# Patient Record
Sex: FEMALE | Race: WHITE | NOT HISPANIC OR LATINO | Employment: FULL TIME | URBAN - METROPOLITAN AREA
[De-identification: names, ages, dates, MRNs, and addresses within clinical notes are randomized per-mention and may not be internally consistent; named-entity substitution may affect disease eponyms.]

---

## 2017-03-06 ENCOUNTER — TRANSCRIBE ORDERS (OUTPATIENT)
Dept: ADMINISTRATIVE | Facility: HOSPITAL | Age: 57
End: 2017-03-06

## 2017-03-06 ENCOUNTER — HOSPITAL ENCOUNTER (OUTPATIENT)
Dept: GENERAL RADIOLOGY | Facility: HOSPITAL | Age: 57
Discharge: HOME | End: 2017-03-06
Admitting: PHYSICIAN ASSISTANT

## 2017-03-06 DIAGNOSIS — R52 PAIN: Primary | ICD-10-CM

## 2017-03-06 DIAGNOSIS — R52 PAIN: ICD-10-CM

## 2017-03-06 PROCEDURE — 73610 X-RAY EXAM OF ANKLE: CPT

## 2017-11-02 ENCOUNTER — TRANSCRIBE ORDERS (OUTPATIENT)
Dept: ADMINISTRATIVE | Facility: HOSPITAL | Age: 57
End: 2017-11-02

## 2017-11-02 DIAGNOSIS — Z12.39 SCREENING BREAST EXAMINATION: Primary | ICD-10-CM

## 2017-12-07 ENCOUNTER — HOSPITAL ENCOUNTER (OUTPATIENT)
Dept: MAMMOGRAPHY | Facility: HOSPITAL | Age: 57
Discharge: HOME | End: 2017-12-07
Admitting: NURSE PRACTITIONER

## 2017-12-07 DIAGNOSIS — Z12.39 SCREENING BREAST EXAMINATION: ICD-10-CM

## 2017-12-07 PROCEDURE — 77063 BREAST TOMOSYNTHESIS BI: CPT

## 2017-12-07 PROCEDURE — G0202 SCR MAMMO BI INCL CAD: HCPCS

## 2018-01-31 ENCOUNTER — OFFICE VISIT (OUTPATIENT)
Dept: FAMILY MEDICINE CLINIC | Facility: CLINIC | Age: 58
End: 2018-01-31

## 2018-01-31 VITALS
HEIGHT: 66 IN | BODY MASS INDEX: 38.57 KG/M2 | HEART RATE: 66 BPM | TEMPERATURE: 98.6 F | DIASTOLIC BLOOD PRESSURE: 100 MMHG | SYSTOLIC BLOOD PRESSURE: 162 MMHG | WEIGHT: 240 LBS | RESPIRATION RATE: 16 BRPM

## 2018-01-31 DIAGNOSIS — I10 ESSENTIAL HYPERTENSION: ICD-10-CM

## 2018-01-31 DIAGNOSIS — R73.9 HYPERGLYCEMIA: Primary | ICD-10-CM

## 2018-01-31 DIAGNOSIS — E78.00 HIGH CHOLESTEROL: ICD-10-CM

## 2018-01-31 PROCEDURE — 99203 OFFICE O/P NEW LOW 30 MIN: CPT | Performed by: NURSE PRACTITIONER

## 2018-01-31 RX ORDER — ATORVASTATIN CALCIUM 80 MG/1
TABLET, FILM COATED ORAL
COMMUNITY
Start: 2018-01-17 | End: 2018-01-31 | Stop reason: SDUPTHER

## 2018-01-31 RX ORDER — GEMFIBROZIL 600 MG/1
600 TABLET, FILM COATED ORAL
COMMUNITY
End: 2018-01-31 | Stop reason: SDUPTHER

## 2018-01-31 RX ORDER — SIMVASTATIN 20 MG
TABLET ORAL
COMMUNITY
Start: 2017-12-06 | End: 2018-03-09

## 2018-01-31 RX ORDER — LEVOCETIRIZINE DIHYDROCHLORIDE 5 MG/1
TABLET, FILM COATED ORAL
COMMUNITY
Start: 2018-01-17 | End: 2018-02-08 | Stop reason: SDUPTHER

## 2018-01-31 RX ORDER — AMLODIPINE BESYLATE 10 MG/1
10 TABLET ORAL DAILY
Qty: 90 TABLET | Refills: 0 | Status: SHIPPED | OUTPATIENT
Start: 2018-01-31 | End: 2018-03-09

## 2018-01-31 RX ORDER — CITALOPRAM 40 MG/1
TABLET ORAL
COMMUNITY
Start: 2018-01-17 | End: 2018-02-08 | Stop reason: SDUPTHER

## 2018-01-31 RX ORDER — AMLODIPINE BESYLATE 5 MG/1
TABLET ORAL
COMMUNITY
Start: 2018-01-17 | End: 2018-01-31 | Stop reason: DRUGHIGH

## 2018-01-31 RX ORDER — GEMFIBROZIL 600 MG/1
600 TABLET, FILM COATED ORAL
Qty: 180 TABLET | Refills: 0 | Status: SHIPPED | OUTPATIENT
Start: 2018-01-31 | End: 2019-04-17 | Stop reason: ALTCHOICE

## 2018-01-31 RX ORDER — GEMFIBROZIL 600 MG/1
TABLET, FILM COATED ORAL
COMMUNITY
Start: 2017-10-27 | End: 2018-01-31 | Stop reason: ALTCHOICE

## 2018-01-31 RX ORDER — ATORVASTATIN CALCIUM 80 MG/1
80 TABLET, FILM COATED ORAL NIGHTLY
Qty: 90 TABLET | Refills: 0 | Status: SHIPPED | OUTPATIENT
Start: 2018-01-31 | End: 2018-04-09 | Stop reason: SDUPTHER

## 2018-01-31 RX ORDER — FUROSEMIDE 20 MG/1
TABLET ORAL
COMMUNITY
Start: 2018-01-17 | End: 2018-02-08 | Stop reason: SDUPTHER

## 2018-01-31 NOTE — PROGRESS NOTES
Subjective   Shaunna Carreno is a 57 y.o. female. Patient is here today for   Chief Complaint   Patient presents with   • Establish Care     new patient    • Hypertension          Vitals:    01/31/18 1348   BP: 162/100   Pulse: 66   Resp: 16   Temp: 98.6 °F (37 °C)     The following portions of the patient's history were reviewed and updated as appropriate: allergies, current medications, past family history, past medical history, past social history, past surgical history and problem list.    Past Medical History:   Diagnosis Date   • Hyperlipidemia    • Hypertension       No Known Allergies   Social History     Social History   • Marital status:      Spouse name: N/A   • Number of children: N/A   • Years of education: N/A     Occupational History   • Not on file.     Social History Main Topics   • Smoking status: Never Smoker   • Smokeless tobacco: Never Used   • Alcohol use Yes   • Drug use: Not on file   • Sexual activity: Not on file     Other Topics Concern   • Not on file     Social History Narrative        Current Outpatient Prescriptions:   •  gemfibrozil (LOPID) 600 MG tablet, Take 1 tablet by mouth 2 (Two) Times a Day Before Meals., Disp: 180 tablet, Rfl: 0  •  Probiotic Product (PROBIOTIC DAILY PO), Take  by mouth., Disp: , Rfl:   •  amLODIPine (NORVASC) 10 MG tablet, Take 1 tablet by mouth Daily., Disp: 90 tablet, Rfl: 0  •  atorvastatin (LIPITOR) 80 MG tablet, Take 1 tablet by mouth Every Night., Disp: 90 tablet, Rfl: 0  •  citalopram (CeleXA) 40 MG tablet, , Disp: , Rfl:   •  furosemide (LASIX) 20 MG tablet, , Disp: , Rfl:   •  levocetirizine (XYZAL) 5 MG tablet, , Disp: , Rfl:   •  simvastatin (ZOCOR) 20 MG tablet, , Disp: , Rfl:      Objective     History of Present Illness  Shaunna is here to establish care. She is a previous patient of [facility]. I cannot get the records as they are no longer opened. She has a history of HTN, HLD, and anxiety, and Chron's disease. She reports  that her blood pressure has not been well controlled and is always elevated. She is compliant with her medication. She eats a healthy diet but does not exercise often. She denies any cardiac symptoms  Overall she feels well. She had a full cardiac workup with echo at [facility] in 2013 for an admission for sepsis. I reviewed those records in care everywhere.   She denies any symptoms of NOAH such as daytime hypersomnia or witnessed apnea     Review of Systems   Constitutional: Negative for chills, fatigue and unexpected weight change.   HENT: Negative.    Eyes: Negative for visual disturbance.   Respiratory: Negative for cough and wheezing.    Cardiovascular: Negative for chest pain, palpitations and leg swelling.   Genitourinary: Negative.    Musculoskeletal: Negative for arthralgias.   Neurological: Negative for dizziness, weakness and headaches.       Physical Exam   Constitutional: Vital signs are normal. She appears well-developed and well-nourished. She does not appear ill. No distress.   HENT:   Right Ear: Tympanic membrane and ear canal normal.   Left Ear: Tympanic membrane and ear canal normal.   Nose: Nose normal.   Mouth/Throat: Uvula is midline, oropharynx is clear and moist and mucous membranes are normal.   Cardiovascular: Normal rate, regular rhythm and normal heart sounds.    Pulmonary/Chest: Effort normal and breath sounds normal.   Neurological: She is alert.   Skin: Skin is warm, dry and intact.   Psychiatric: She has a normal mood and affect.       ASSESSMENT     Problem List Items Addressed This Visit     High cholesterol    Relevant Medications    simvastatin (ZOCOR) 20 MG tablet    atorvastatin (LIPITOR) 80 MG tablet    gemfibrozil (LOPID) 600 MG tablet    Hyperglycemia - Primary    Hypertension    Relevant Medications    furosemide (LASIX) 20 MG tablet    amLODIPine (NORVASC) 10 MG tablet          PLAN  Her blood pressure is high, will increase amlodipine  Continue lasix,   Discussed low sodium  diet, weight loss and exercise  Medications refilled,   She is not fasting today for labs, will have her return for labs and a follow up to recheck BP   Will check tsh, UA, A1C, cmp, cbc, hep c screening in one month     Return for with labs fasting .

## 2018-02-08 RX ORDER — CITALOPRAM 40 MG/1
TABLET ORAL
Qty: 90 TABLET | Refills: 0 | Status: SHIPPED | OUTPATIENT
Start: 2018-02-08 | End: 2018-03-09 | Stop reason: SDUPTHER

## 2018-02-08 RX ORDER — FUROSEMIDE 20 MG/1
TABLET ORAL
Qty: 90 TABLET | Refills: 0 | Status: SHIPPED | OUTPATIENT
Start: 2018-02-08 | End: 2018-03-12 | Stop reason: SDUPTHER

## 2018-02-08 RX ORDER — AMLODIPINE BESYLATE 5 MG/1
TABLET ORAL
Qty: 90 TABLET | Refills: 0 | Status: SHIPPED | OUTPATIENT
Start: 2018-02-08 | End: 2018-03-09

## 2018-02-08 RX ORDER — LEVOCETIRIZINE DIHYDROCHLORIDE 5 MG/1
TABLET, FILM COATED ORAL
Qty: 30 TABLET | Refills: 0 | Status: SHIPPED | OUTPATIENT
Start: 2018-02-08 | End: 2018-09-18 | Stop reason: SDUPTHER

## 2018-02-16 ENCOUNTER — TELEPHONE (OUTPATIENT)
Dept: FAMILY MEDICINE CLINIC | Facility: CLINIC | Age: 58
End: 2018-02-16

## 2018-02-16 DIAGNOSIS — Z79.899 LONG TERM USE OF DRUG: ICD-10-CM

## 2018-02-16 DIAGNOSIS — R73.9 HYPERGLYCEMIA: ICD-10-CM

## 2018-02-16 DIAGNOSIS — Z11.59 NEED FOR HEPATITIS C SCREENING TEST: ICD-10-CM

## 2018-02-16 DIAGNOSIS — Z13.29 SCREENING FOR THYROID DISORDER: Primary | ICD-10-CM

## 2018-03-01 DIAGNOSIS — Z11.59 NEED FOR HEPATITIS C SCREENING TEST: ICD-10-CM

## 2018-03-01 DIAGNOSIS — Z79.899 LONG TERM USE OF DRUG: Primary | ICD-10-CM

## 2018-03-01 DIAGNOSIS — R73.9 HYPERGLYCEMIA: ICD-10-CM

## 2018-03-01 DIAGNOSIS — Z13.29 SCREENING FOR THYROID DISORDER: ICD-10-CM

## 2018-03-03 LAB
ALBUMIN SERPL-MCNC: 4.7 G/DL (ref 3.5–5.2)
ALBUMIN/GLOB SERPL: 1.7 G/DL
ALP SERPL-CCNC: 46 U/L (ref 39–117)
ALT SERPL-CCNC: 56 U/L (ref 1–33)
AST SERPL-CCNC: 89 U/L (ref 1–32)
BASOPHILS # BLD AUTO: 0.04 10*3/MM3 (ref 0–0.2)
BASOPHILS NFR BLD AUTO: 0.8 % (ref 0–1.5)
BILIRUB SERPL-MCNC: 0.3 MG/DL (ref 0.1–1.2)
BUN SERPL-MCNC: 10 MG/DL (ref 6–20)
BUN/CREAT SERPL: 25 (ref 7–25)
CALCIUM SERPL-MCNC: 9.8 MG/DL (ref 8.6–10.5)
CHLORIDE SERPL-SCNC: 101 MMOL/L (ref 98–107)
CO2 SERPL-SCNC: 27.9 MMOL/L (ref 22–29)
CREAT SERPL-MCNC: 0.4 MG/DL (ref 0.57–1)
EOSINOPHIL # BLD AUTO: 0.27 10*3/MM3 (ref 0–0.7)
EOSINOPHIL NFR BLD AUTO: 5.3 % (ref 0.3–6.2)
ERYTHROCYTE [DISTWIDTH] IN BLOOD BY AUTOMATED COUNT: 14.1 % (ref 11.7–13)
GFR SERPLBLD CREATININE-BSD FMLA CKD-EPI: >150 ML/MIN/1.73
GFR SERPLBLD CREATININE-BSD FMLA CKD-EPI: >150 ML/MIN/1.73
GLOBULIN SER CALC-MCNC: 2.8 GM/DL
GLUCOSE SERPL-MCNC: 98 MG/DL (ref 65–99)
HBA1C MFR BLD: 5.02 % (ref 4.8–5.6)
HCT VFR BLD AUTO: 35.2 % (ref 35.6–45.5)
HCV AB S/CO SERPL IA: <0.1 S/CO RATIO (ref 0–0.9)
HGB BLD-MCNC: 12.1 G/DL (ref 11.9–15.5)
IMM GRANULOCYTES # BLD: 0.08 10*3/MM3 (ref 0–0.03)
IMM GRANULOCYTES NFR BLD: 1.6 % (ref 0–0.5)
LYMPHOCYTES # BLD AUTO: 1.55 10*3/MM3 (ref 0.9–4.8)
LYMPHOCYTES NFR BLD AUTO: 30.3 % (ref 19.6–45.3)
MCH RBC QN AUTO: 34.4 PG (ref 26.9–32)
MCHC RBC AUTO-ENTMCNC: 34.4 G/DL (ref 32.4–36.3)
MCV RBC AUTO: 100 FL (ref 80.5–98.2)
MICROALBUMIN UR-MCNC: 252.3 UG/ML
MONOCYTES # BLD AUTO: 0.37 10*3/MM3 (ref 0.2–1.2)
MONOCYTES NFR BLD AUTO: 7.2 % (ref 5–12)
NEUTROPHILS # BLD AUTO: 2.8 10*3/MM3 (ref 1.9–8.1)
NEUTROPHILS NFR BLD AUTO: 54.8 % (ref 42.7–76)
PLATELET # BLD AUTO: 179 10*3/MM3 (ref 140–500)
POTASSIUM SERPL-SCNC: 4.1 MMOL/L (ref 3.5–5.2)
PROT SERPL-MCNC: 7.5 G/DL (ref 6–8.5)
RBC # BLD AUTO: 3.52 10*6/MM3 (ref 3.9–5.2)
SODIUM SERPL-SCNC: 145 MMOL/L (ref 136–145)
TSH SERPL DL<=0.005 MIU/L-ACNC: 3.86 MIU/ML (ref 0.27–4.2)
WBC # BLD AUTO: 5.11 10*3/MM3 (ref 4.5–10.7)

## 2018-03-08 LAB
CHOLEST SERPL-MCNC: 239 MG/DL (ref 0–200)
HDLC SERPL-MCNC: 65 MG/DL (ref 40–60)
LDLC SERPL CALC-MCNC: 117 MG/DL (ref 0–100)
LDLC/HDLC SERPL: 1.81 {RATIO}
Lab: NORMAL
TRIGL SERPL-MCNC: 283 MG/DL (ref 0–150)
VLDLC SERPL CALC-MCNC: 56.6 MG/DL (ref 5–40)
WRITTEN AUTHORIZATION: NORMAL

## 2018-03-09 ENCOUNTER — OFFICE VISIT (OUTPATIENT)
Dept: FAMILY MEDICINE CLINIC | Facility: CLINIC | Age: 58
End: 2018-03-09

## 2018-03-09 VITALS
BODY MASS INDEX: 37.83 KG/M2 | TEMPERATURE: 97 F | OXYGEN SATURATION: 98 % | SYSTOLIC BLOOD PRESSURE: 160 MMHG | HEIGHT: 67 IN | DIASTOLIC BLOOD PRESSURE: 90 MMHG | WEIGHT: 241 LBS | RESPIRATION RATE: 17 BRPM | HEART RATE: 97 BPM

## 2018-03-09 DIAGNOSIS — E78.00 HIGH CHOLESTEROL: Primary | ICD-10-CM

## 2018-03-09 DIAGNOSIS — Z23 NEED FOR DIPHTHERIA-TETANUS-PERTUSSIS (TDAP) VACCINE: ICD-10-CM

## 2018-03-09 DIAGNOSIS — E66.9 OBESITY (BMI 30-39.9): ICD-10-CM

## 2018-03-09 DIAGNOSIS — I10 ESSENTIAL HYPERTENSION: ICD-10-CM

## 2018-03-09 DIAGNOSIS — F41.9 ANXIETY: ICD-10-CM

## 2018-03-09 PROCEDURE — 99214 OFFICE O/P EST MOD 30 MIN: CPT | Performed by: NURSE PRACTITIONER

## 2018-03-09 PROCEDURE — 90715 TDAP VACCINE 7 YRS/> IM: CPT | Performed by: NURSE PRACTITIONER

## 2018-03-09 PROCEDURE — 90471 IMMUNIZATION ADMIN: CPT | Performed by: NURSE PRACTITIONER

## 2018-03-09 RX ORDER — AMLODIPINE BESYLATE 10 MG/1
10 TABLET ORAL DAILY
COMMUNITY
End: 2018-04-09 | Stop reason: SDUPTHER

## 2018-03-09 RX ORDER — CITALOPRAM 40 MG/1
40 TABLET ORAL DAILY
Qty: 90 TABLET | Refills: 3 | Status: SHIPPED | OUTPATIENT
Start: 2018-03-09 | End: 2018-04-09 | Stop reason: SDUPTHER

## 2018-03-09 RX ORDER — FUROSEMIDE 20 MG/1
TABLET ORAL
Qty: 90 TABLET | Refills: 0 | Status: CANCELLED | OUTPATIENT
Start: 2018-03-09

## 2018-03-09 NOTE — PROGRESS NOTES
Subjective   Shaunna Carreno is a 57 y.o. female. Patient is here today for   Chief Complaint   Patient presents with   • Hyperlipidemia   • Anxiety     medication refill          Vitals:    03/09/18 0807   BP: 160/90   Pulse: 97   Resp: 17   Temp: 97 °F (36.1 °C)   SpO2: 98%       The following portions of the patient's history were reviewed and updated as appropriate: allergies, current medications, past family history, past medical history, past social history, past surgical history and problem list.    Past Medical History:   Diagnosis Date   • Hyperlipidemia    • Hypertension       No Known Allergies   Social History     Social History   • Marital status:      Spouse name: N/A   • Number of children: N/A   • Years of education: N/A     Occupational History   • Not on file.     Social History Main Topics   • Smoking status: Never Smoker   • Smokeless tobacco: Never Used   • Alcohol use Yes   • Drug use: Not on file   • Sexual activity: Not on file     Other Topics Concern   • Not on file     Social History Narrative        Current Outpatient Prescriptions:   •  amLODIPine (NORVASC) 10 MG tablet, Take 10 mg by mouth Daily., Disp: , Rfl:   •  atorvastatin (LIPITOR) 80 MG tablet, Take 1 tablet by mouth Every Night., Disp: 90 tablet, Rfl: 0  •  citalopram (CeleXA) 40 MG tablet, Take 1 tablet by mouth Daily., Disp: 90 tablet, Rfl: 3  •  furosemide (LASIX) 20 MG tablet, TAKE ONE tablet (by mouth) EACH DAY, Disp: 90 tablet, Rfl: 0  •  gemfibrozil (LOPID) 600 MG tablet, Take 1 tablet by mouth 2 (Two) Times a Day Before Meals., Disp: 180 tablet, Rfl: 0  •  levocetirizine (XYZAL) 5 MG tablet, TAKE ONE tablet (by mouth) EACH EVENING, Disp: 30 tablet, Rfl: 0  •  Probiotic Product (PROBIOTIC DAILY PO), Take  by mouth., Disp: , Rfl:      Objective   History of Present Illness  Shaunna is here for a follow up for HLD, HTN, and anxiety. She reports that she has been watching her diet but not exercising regularly. She has not  been checking her BP at home. I increased amlodipine to 10mg. She has been compliant with her medication and is not experiencing any side effects. She ran out of her celexa and has had increased anxiety. She needs this refilled today.     Review of Systems   Constitutional: Negative for chills, fatigue and fever.   HENT: Negative.    Eyes: Negative for visual disturbance.   Respiratory: Negative for cough, chest tightness, shortness of breath and wheezing.    Cardiovascular: Negative for chest pain, palpitations and leg swelling.   Gastrointestinal: Negative.    Genitourinary: Negative.    Musculoskeletal: Positive for arthralgias (wrist pain ).   Neurological: Negative for dizziness, tremors, syncope, speech difficulty, weakness, light-headedness and headaches.   Psychiatric/Behavioral: Negative for dysphoric mood and sleep disturbance. The patient is nervous/anxious.        Physical Exam   Constitutional: She is oriented to person, place, and time. Vital signs are normal. She appears well-developed and well-nourished. She does not appear ill. No distress.   HENT:   Mouth/Throat: Mucous membranes are normal.   Cardiovascular: Normal rate, regular rhythm and normal heart sounds.    Pulmonary/Chest: Effort normal and breath sounds normal.   Neurological: She is alert and oriented to person, place, and time.   Skin: Skin is warm, dry and intact.   Psychiatric: She has a normal mood and affect.     No visits with results within 1 Week(s) from this visit.  Latest known visit with results is:    Orders Only on 03/01/2018   Component Date Value Ref Range Status   • TSH 03/02/2018 3.860  0.270 - 4.200 mIU/mL Final   • Microalbumin, Urine 03/02/2018 252.3  Not Estab. ug/mL Final   • Hemoglobin A1C 03/02/2018 5.02  4.80 - 5.60 % Final    Comment: Hemoglobin A1C Ranges:  Increased Risk for Diabetes  5.7% to 6.4%  Diabetes                     >= 6.5%  Diabetic Goal                < 7.0%     • Glucose 03/02/2018 98  65 - 99  mg/dL Final   • BUN 03/02/2018 10  6 - 20 mg/dL Final   • Creatinine 03/02/2018 0.40* 0.57 - 1.00 mg/dL Final   • eGFR Non  Am 03/02/2018 >150  >60 mL/min/1.73 Final   • eGFR African Am 03/02/2018 >150  >60 mL/min/1.73 Final   • BUN/Creatinine Ratio 03/02/2018 25.0  7.0 - 25.0 Final   • Sodium 03/02/2018 145  136 - 145 mmol/L Final   • Potassium 03/02/2018 4.1  3.5 - 5.2 mmol/L Final   • Chloride 03/02/2018 101  98 - 107 mmol/L Final   • Total CO2 03/02/2018 27.9  22.0 - 29.0 mmol/L Final   • Calcium 03/02/2018 9.8  8.6 - 10.5 mg/dL Final   • Total Protein 03/02/2018 7.5  6.0 - 8.5 g/dL Final   • Albumin 03/02/2018 4.70  3.50 - 5.20 g/dL Final   • Globulin 03/02/2018 2.8  gm/dL Final   • A/G Ratio 03/02/2018 1.7  g/dL Final   • Total Bilirubin 03/02/2018 0.3  0.1 - 1.2 mg/dL Final   • Alkaline Phosphatase 03/02/2018 46  39 - 117 U/L Final   • AST (SGOT) 03/02/2018 89* 1 - 32 U/L Final   • ALT (SGPT) 03/02/2018 56* 1 - 33 U/L Final   • WBC 03/02/2018 5.11  4.50 - 10.70 10*3/mm3 Final   • RBC 03/02/2018 3.52* 3.90 - 5.20 10*6/mm3 Final   • Hemoglobin 03/02/2018 12.1  11.9 - 15.5 g/dL Final   • Hematocrit 03/02/2018 35.2* 35.6 - 45.5 % Final   • MCV 03/02/2018 100.0* 80.5 - 98.2 fL Final   • MCH 03/02/2018 34.4* 26.9 - 32.0 pg Final   • MCHC 03/02/2018 34.4  32.4 - 36.3 g/dL Final   • RDW 03/02/2018 14.1* 11.7 - 13.0 % Final   • Platelets 03/02/2018 179  140 - 500 10*3/mm3 Final   • Neutrophil Rel % 03/02/2018 54.8  42.7 - 76.0 % Final   • Lymphocyte Rel % 03/02/2018 30.3  19.6 - 45.3 % Final   • Monocyte Rel % 03/02/2018 7.2  5.0 - 12.0 % Final   • Eosinophil Rel % 03/02/2018 5.3  0.3 - 6.2 % Final   • Basophil Rel % 03/02/2018 0.8  0.0 - 1.5 % Final   • Neutrophils Absolute 03/02/2018 2.80  1.90 - 8.10 10*3/mm3 Final   • Lymphocytes Absolute 03/02/2018 1.55  0.90 - 4.80 10*3/mm3 Final   • Monocytes Absolute 03/02/2018 0.37  0.20 - 1.20 10*3/mm3 Final   • Eosinophils Absolute 03/02/2018 0.27  0.00 - 0.70  10*3/mm3 Final   • Basophils Absolute 03/02/2018 0.04  0.00 - 0.20 10*3/mm3 Final   • Immature Granulocyte Rel % 03/02/2018 1.6* 0.0 - 0.5 % Final   • Immature Grans Absolute 03/02/2018 0.08* 0.00 - 0.03 10*3/mm3 Final   • Hep C Virus Ab 03/02/2018 <0.1  0.0 - 0.9 s/co ratio Final    Comment:                                   Negative:     < 0.8                               Indeterminate: 0.8 - 0.9                                    Positive:     > 0.9   The CDC recommends that a positive HCV antibody result   be followed up with a HCV Nucleic Acid Amplification   test ([id number]).     • Total Cholesterol 03/02/2018 239* 0 - 200 mg/dL Final   • Triglycerides 03/02/2018 283* 0 - 150 mg/dL Final   • HDL Cholesterol 03/02/2018 65* 40 - 60 mg/dL Final   • VLDL Cholesterol 03/02/2018 56.6* 5 - 40 mg/dL Final   • LDL Cholesterol  03/02/2018 117* 0 - 100 mg/dL Final   • LDL/HDL Ratio 03/02/2018 1.81   Final   • Please note 03/02/2018 Comment   Final    Comment: We have received your request for additional testing or test  verification.  You will be notified if we are unable to process  your request.     • Written Authorization 03/02/2018 Comment   Final    Comment: Written Authorization Received.  Authorization received from WRITTEN REQUEST 03-  Logged by Merry Morrissey         ASSESSMENT  Problem List Items Addressed This Visit     High cholesterol - Primary    Hypertension    Relevant Medications    amLODIPine (NORVASC) 10 MG tablet    Obesity (BMI 30-39.9)      Other Visit Diagnoses     Anxiety        Need for diphtheria-tetanus-pertussis (Tdap) vaccine        Relevant Orders    Tdap Vaccine Greater Than or Equal To 6yo IM (Completed)          PLAN  I reviewed her labs with her in detail and compared to last years lipid panel that she brought from her previous office. Her cholesterol is about the same ldl has improved and trigs have improved. She has been taking atorvastatin and simvastatin and her liver enzymes are  elevated. I have asked her to stop the zocor and if her cholesterol is still elevated will add zetia  Monitor BP at home, call if >140/90, I would like her to follow up in 4 weeks for a BP check, may consider adding losartan if still elevated  celexa refilled  Add exercise,   Information given on vascular screens  TDAP today  Will bring in colonoscopy report from 2 years ago    Return for 4 weeks with BP check , Recheck fall with lipid panel and cmp .

## 2018-03-12 RX ORDER — FUROSEMIDE 20 MG/1
20 TABLET ORAL DAILY
Qty: 90 TABLET | Refills: 0 | Status: SHIPPED | OUTPATIENT
Start: 2018-03-12 | End: 2018-04-30 | Stop reason: SDUPTHER

## 2018-03-12 RX ORDER — CETIRIZINE HYDROCHLORIDE 10 MG/1
10 TABLET ORAL DAILY
Qty: 30 TABLET | Refills: 5 | Status: SHIPPED | OUTPATIENT
Start: 2018-03-12 | End: 2018-04-30 | Stop reason: SDUPTHER

## 2018-03-13 ENCOUNTER — TELEPHONE (OUTPATIENT)
Dept: FAMILY MEDICINE CLINIC | Facility: CLINIC | Age: 58
End: 2018-03-13

## 2018-03-13 NOTE — TELEPHONE ENCOUNTER
----spoke to patient rx was called in to local pharmacy onfile    - Message from Rain Christianson sent at 3/13/2018  8:21 AM EDT -----  PHARMACY DIDN'T RECIEVE THE RX FUROSEMIDE 20MG  AND SHE IS WONDERING IF SHE WAS SUPPOSE TO HAVE THAT MEDICATION SENT TO THE PHARMACY     PLEASE CALL PT TO LET HER KNOW IF THAT MED WAS ONE THAT SHE WAS TO RECEIVE OR NOT     [phone]

## 2018-04-09 ENCOUNTER — OFFICE VISIT (OUTPATIENT)
Dept: FAMILY MEDICINE CLINIC | Facility: CLINIC | Age: 58
End: 2018-04-09

## 2018-04-09 VITALS
SYSTOLIC BLOOD PRESSURE: 150 MMHG | OXYGEN SATURATION: 98 % | HEIGHT: 67 IN | HEART RATE: 74 BPM | TEMPERATURE: 98.2 F | RESPIRATION RATE: 17 BRPM | BODY MASS INDEX: 37.04 KG/M2 | WEIGHT: 236 LBS | DIASTOLIC BLOOD PRESSURE: 82 MMHG

## 2018-04-09 DIAGNOSIS — I10 ESSENTIAL HYPERTENSION: Primary | ICD-10-CM

## 2018-04-09 PROCEDURE — 99213 OFFICE O/P EST LOW 20 MIN: CPT | Performed by: NURSE PRACTITIONER

## 2018-04-09 RX ORDER — ATORVASTATIN CALCIUM 80 MG/1
80 TABLET, FILM COATED ORAL NIGHTLY
Qty: 30 TABLET | Refills: 1 | Status: SHIPPED | OUTPATIENT
Start: 2018-04-09 | End: 2018-10-01 | Stop reason: SDUPTHER

## 2018-04-09 RX ORDER — LOSARTAN POTASSIUM 25 MG/1
25 TABLET ORAL DAILY
Qty: 90 TABLET | Refills: 3 | Status: SHIPPED | OUTPATIENT
Start: 2018-04-09 | End: 2018-04-09 | Stop reason: SDUPTHER

## 2018-04-09 RX ORDER — ATORVASTATIN CALCIUM 80 MG/1
80 TABLET, FILM COATED ORAL NIGHTLY
Qty: 90 TABLET | Refills: 1 | Status: SHIPPED | OUTPATIENT
Start: 2018-04-09 | End: 2018-04-09 | Stop reason: SDUPTHER

## 2018-04-09 RX ORDER — CITALOPRAM 40 MG/1
40 TABLET ORAL DAILY
Qty: 90 TABLET | Refills: 3 | Status: SHIPPED | OUTPATIENT
Start: 2018-04-09 | End: 2018-04-30 | Stop reason: SDUPTHER

## 2018-04-09 RX ORDER — AMLODIPINE BESYLATE 10 MG/1
10 TABLET ORAL DAILY
Qty: 90 TABLET | Refills: 3 | Status: SHIPPED | OUTPATIENT
Start: 2018-04-09 | End: 2018-04-09 | Stop reason: SDUPTHER

## 2018-04-09 RX ORDER — LOSARTAN POTASSIUM 25 MG/1
25 TABLET ORAL DAILY
Qty: 30 TABLET | Refills: 3 | Status: SHIPPED | OUTPATIENT
Start: 2018-04-09 | End: 2018-10-01 | Stop reason: SDUPTHER

## 2018-04-09 RX ORDER — AMLODIPINE BESYLATE 10 MG/1
10 TABLET ORAL DAILY
Qty: 30 TABLET | Refills: 3 | Status: SHIPPED | OUTPATIENT
Start: 2018-04-09 | End: 2019-04-17 | Stop reason: SDUPTHER

## 2018-04-09 NOTE — PROGRESS NOTES
Subjective   Shaunna Carreno is a 57 y.o. female.   Chief Complaint   Patient presents with   • Hypertension     Vitals:    04/09/18 0810   BP: 150/82   Pulse: 74   Resp: 17   Temp: 98.2 °F (36.8 °C)   SpO2: 98%     No LMP recorded. Patient is postmenopausal.    History of Present Illness  Shaunna is here for a 4 week blood pressure check. She has a history of HTN. She came from another practice and although her BP was running 150-160/90 she was only on lasix. She was started on amlodipine which was increased to 10mg at her last visit. She has been monitoring her BP at home and reports that her blood pressure has been 150's systolic and below 90 diastolic. She has been watching what she eats but has not been exercising. She has lost 5 lbs in one month. She overall feels great. She has been compliant with her medication and is not experiencing any side effects.     The following portions of the patient's history were reviewed and updated as appropriate: allergies, current medications, past family history, past medical history, past social history, past surgical history and problem list.    Review of Systems   Constitutional: Negative for fatigue.   Eyes: Negative for visual disturbance.   Respiratory: Negative.    Cardiovascular: Positive for leg swelling. Negative for chest pain and palpitations.   Gastrointestinal: Negative.    Musculoskeletal: Negative for arthralgias.   Neurological: Negative for dizziness and headaches.       Objective   Physical Exam   Constitutional: Vital signs are normal. She appears well-developed and well-nourished. She does not appear ill. No distress.   Cardiovascular: Normal rate, regular rhythm and normal heart sounds.    Pulmonary/Chest: Effort normal and breath sounds normal.   Musculoskeletal:        Right lower leg: She exhibits no swelling.        Left lower leg: She exhibits no swelling.   Neurological: She is alert.   Skin: Skin is warm and dry.       Assessment/Plan   Shaunna was  seen today for hypertension.    Diagnoses and all orders for this visit:    Essential hypertension    Other orders  -     Discontinue: losartan (COZAAR) 25 MG tablet; Take 1 tablet by mouth Daily.  -     Discontinue: atorvastatin (LIPITOR) 80 MG tablet; Take 1 tablet by mouth Every Night.  -     Discontinue: amLODIPine (NORVASC) 10 MG tablet; Take 1 tablet by mouth Daily.  -     losartan (COZAAR) 25 MG tablet; Take 1 tablet by mouth Daily.  -     amLODIPine (NORVASC) 10 MG tablet; Take 1 tablet by mouth Daily.  -     atorvastatin (LIPITOR) 80 MG tablet; Take 1 tablet by mouth Every Night.      Will start losartan 25mg daily  Monitor BP at home 1-2 times daily.   Exercise, continue with diet and weight loss  BP goal is less than 130/80  30 day meds sent to retail and 90 sent to mail order  She will bring in her BP log in 2-4 weeks for me to review   Follow up in the fall with labs or sooner if needed   Will bring in colonoscopy report

## 2018-04-30 ENCOUNTER — TELEPHONE (OUTPATIENT)
Dept: FAMILY MEDICINE CLINIC | Facility: CLINIC | Age: 58
End: 2018-04-30

## 2018-04-30 RX ORDER — CETIRIZINE HYDROCHLORIDE 10 MG/1
10 TABLET ORAL DAILY
Qty: 90 TABLET | Refills: 1 | Status: SHIPPED | OUTPATIENT
Start: 2018-04-30 | End: 2018-09-18 | Stop reason: SDUPTHER

## 2018-04-30 RX ORDER — FUROSEMIDE 20 MG/1
20 TABLET ORAL DAILY
Qty: 90 TABLET | Refills: 1 | Status: SHIPPED | OUTPATIENT
Start: 2018-04-30 | End: 2018-10-25 | Stop reason: SDUPTHER

## 2018-04-30 RX ORDER — CITALOPRAM 40 MG/1
40 TABLET ORAL DAILY
Qty: 90 TABLET | Refills: 1 | Status: SHIPPED | OUTPATIENT
Start: 2018-04-30 | End: 2018-10-25 | Stop reason: SDUPTHER

## 2018-08-28 ENCOUNTER — TRANSCRIBE ORDERS (OUTPATIENT)
Dept: ADMINISTRATIVE | Facility: HOSPITAL | Age: 58
End: 2018-08-28

## 2018-08-28 DIAGNOSIS — M67.431 GANGLION OF RIGHT WRIST: ICD-10-CM

## 2018-08-28 DIAGNOSIS — M67.432 GANGLION OF LEFT WRIST: Primary | ICD-10-CM

## 2018-08-31 ENCOUNTER — HOSPITAL ENCOUNTER (OUTPATIENT)
Dept: ULTRASOUND IMAGING | Facility: HOSPITAL | Age: 58
Discharge: HOME | End: 2018-08-31
Attending: PLASTIC SURGERY | Admitting: PLASTIC SURGERY

## 2018-08-31 DIAGNOSIS — M67.432 GANGLION OF LEFT WRIST: ICD-10-CM

## 2018-08-31 DIAGNOSIS — M67.431 GANGLION OF RIGHT WRIST: ICD-10-CM

## 2018-08-31 PROCEDURE — 76882 US LMTD JT/FCL EVL NVASC XTR: CPT

## 2018-09-13 DIAGNOSIS — Z79.899 LONG TERM USE OF DRUG: Primary | ICD-10-CM

## 2018-09-13 DIAGNOSIS — E78.00 HIGH CHOLESTEROL: ICD-10-CM

## 2018-09-13 DIAGNOSIS — E78.5 HYPERLIPIDEMIA, UNSPECIFIED HYPERLIPIDEMIA TYPE: ICD-10-CM

## 2018-09-14 LAB
ALBUMIN SERPL-MCNC: 5.1 G/DL (ref 3.5–5.2)
ALBUMIN/GLOB SERPL: 2.1 G/DL
ALP SERPL-CCNC: 50 U/L (ref 39–117)
ALT SERPL-CCNC: 40 U/L (ref 1–33)
AST SERPL-CCNC: 37 U/L (ref 1–32)
BASOPHILS # BLD AUTO: 0.04 10*3/MM3 (ref 0–0.2)
BASOPHILS NFR BLD AUTO: 0.7 % (ref 0–1.5)
BILIRUB SERPL-MCNC: 0.4 MG/DL (ref 0.1–1.2)
BUN SERPL-MCNC: 14 MG/DL (ref 6–20)
BUN/CREAT SERPL: 31.8 (ref 7–25)
CALCIUM SERPL-MCNC: 10 MG/DL (ref 8.6–10.5)
CHLORIDE SERPL-SCNC: 100 MMOL/L (ref 98–107)
CHOLEST SERPL-MCNC: 274 MG/DL (ref 0–200)
CO2 SERPL-SCNC: 27.4 MMOL/L (ref 22–29)
CREAT SERPL-MCNC: 0.44 MG/DL (ref 0.57–1)
EOSINOPHIL # BLD AUTO: 0.2 10*3/MM3 (ref 0–0.7)
EOSINOPHIL NFR BLD AUTO: 3.7 % (ref 0.3–6.2)
ERYTHROCYTE [DISTWIDTH] IN BLOOD BY AUTOMATED COUNT: 13.9 % (ref 11.7–13)
GLOBULIN SER CALC-MCNC: 2.4 GM/DL
GLUCOSE SERPL-MCNC: 94 MG/DL (ref 65–99)
HCT VFR BLD AUTO: 40.3 % (ref 35.6–45.5)
HDLC SERPL-MCNC: 80 MG/DL (ref 40–60)
HGB BLD-MCNC: 13.2 G/DL (ref 11.9–15.5)
IMM GRANULOCYTES # BLD: 0.05 10*3/MM3 (ref 0–0.03)
IMM GRANULOCYTES NFR BLD: 0.9 % (ref 0–0.5)
LDLC SERPL CALC-MCNC: 124 MG/DL (ref 0–100)
LDLC/HDLC SERPL: 1.55 {RATIO}
LYMPHOCYTES # BLD AUTO: 1.76 10*3/MM3 (ref 0.9–4.8)
LYMPHOCYTES NFR BLD AUTO: 32.5 % (ref 19.6–45.3)
MCH RBC QN AUTO: 33.8 PG (ref 26.9–32)
MCHC RBC AUTO-ENTMCNC: 32.8 G/DL (ref 32.4–36.3)
MCV RBC AUTO: 103.3 FL (ref 80.5–98.2)
MONOCYTES # BLD AUTO: 0.47 10*3/MM3 (ref 0.2–1.2)
MONOCYTES NFR BLD AUTO: 8.7 % (ref 5–12)
NEUTROPHILS # BLD AUTO: 2.9 10*3/MM3 (ref 1.9–8.1)
NEUTROPHILS NFR BLD AUTO: 53.5 % (ref 42.7–76)
PLATELET # BLD AUTO: 189 10*3/MM3 (ref 140–500)
POTASSIUM SERPL-SCNC: 4.3 MMOL/L (ref 3.5–5.2)
PROT SERPL-MCNC: 7.5 G/DL (ref 6–8.5)
RBC # BLD AUTO: 3.9 10*6/MM3 (ref 3.9–5.2)
SODIUM SERPL-SCNC: 142 MMOL/L (ref 136–145)
TRIGL SERPL-MCNC: 349 MG/DL (ref 0–150)
VLDLC SERPL CALC-MCNC: 69.8 MG/DL (ref 5–40)
WBC # BLD AUTO: 5.42 10*3/MM3 (ref 4.5–10.7)

## 2018-09-18 RX ORDER — CETIRIZINE HYDROCHLORIDE 10 MG/1
TABLET ORAL
Qty: 30 TABLET | Refills: 3 | Status: SHIPPED | OUTPATIENT
Start: 2018-09-18 | End: 2019-03-12 | Stop reason: SDUPTHER

## 2018-09-19 ENCOUNTER — LAB (OUTPATIENT)
Dept: LAB | Facility: HOSPITAL | Age: 58
End: 2018-09-19
Attending: PLASTIC SURGERY

## 2018-09-19 ENCOUNTER — TRANSCRIBE ORDERS (OUTPATIENT)
Dept: ADMINISTRATIVE | Facility: HOSPITAL | Age: 58
End: 2018-09-19

## 2018-09-19 ENCOUNTER — HOSPITAL ENCOUNTER (OUTPATIENT)
Dept: CARDIOLOGY | Facility: HOSPITAL | Age: 58
Discharge: HOME | End: 2018-09-19
Attending: PLASTIC SURGERY | Admitting: PLASTIC SURGERY

## 2018-09-19 DIAGNOSIS — Z01.818 PRE-OP EXAM: ICD-10-CM

## 2018-09-19 DIAGNOSIS — Z01.818 PRE-OP EXAM: Primary | ICD-10-CM

## 2018-09-19 LAB
ANION GAP SERPL CALCULATED.3IONS-SCNC: 13.6 MMOL/L
BUN BLD-MCNC: 12 MG/DL (ref 6–20)
BUN/CREAT SERPL: 23.1 (ref 7–25)
CALCIUM SPEC-SCNC: 10 MG/DL (ref 8.6–10.5)
CHLORIDE SERPL-SCNC: 102 MMOL/L (ref 98–107)
CO2 SERPL-SCNC: 25.4 MMOL/L (ref 22–29)
CREAT BLD-MCNC: 0.52 MG/DL (ref 0.57–1)
GFR SERPL CREATININE-BSD FRML MDRD: 122 ML/MIN/1.73
GLUCOSE BLD-MCNC: 112 MG/DL (ref 65–99)
POTASSIUM BLD-SCNC: 3.7 MMOL/L (ref 3.5–5.2)
SODIUM BLD-SCNC: 141 MMOL/L (ref 136–145)

## 2018-09-19 PROCEDURE — 36415 COLL VENOUS BLD VENIPUNCTURE: CPT

## 2018-09-19 PROCEDURE — 93010 ELECTROCARDIOGRAM REPORT: CPT | Performed by: INTERNAL MEDICINE

## 2018-09-19 PROCEDURE — 93005 ELECTROCARDIOGRAM TRACING: CPT | Performed by: PLASTIC SURGERY

## 2018-09-19 PROCEDURE — 80048 BASIC METABOLIC PNL TOTAL CA: CPT

## 2018-09-25 ENCOUNTER — LAB REQUISITION (OUTPATIENT)
Dept: LAB | Facility: HOSPITAL | Age: 58
End: 2018-09-25

## 2018-09-25 DIAGNOSIS — M67.432 GANGLION OF LEFT WRIST: ICD-10-CM

## 2018-09-25 PROCEDURE — 88304 TISSUE EXAM BY PATHOLOGIST: CPT | Performed by: PLASTIC SURGERY

## 2018-09-25 RX ORDER — ATORVASTATIN CALCIUM 80 MG/1
TABLET, FILM COATED ORAL
Qty: 90 TABLET | Refills: 1 | Status: SHIPPED | OUTPATIENT
Start: 2018-09-25 | End: 2019-03-24 | Stop reason: SDUPTHER

## 2018-09-26 LAB
CYTO UR: NORMAL
LAB AP CASE REPORT: NORMAL
LAB AP CLINICAL INFORMATION: NORMAL
PATH REPORT.FINAL DX SPEC: NORMAL
PATH REPORT.GROSS SPEC: NORMAL

## 2018-10-01 ENCOUNTER — OFFICE VISIT (OUTPATIENT)
Dept: FAMILY MEDICINE CLINIC | Facility: CLINIC | Age: 58
End: 2018-10-01

## 2018-10-01 VITALS
RESPIRATION RATE: 16 BRPM | SYSTOLIC BLOOD PRESSURE: 142 MMHG | HEIGHT: 67 IN | OXYGEN SATURATION: 100 % | HEART RATE: 98 BPM | DIASTOLIC BLOOD PRESSURE: 76 MMHG | TEMPERATURE: 98.2 F | BODY MASS INDEX: 37.04 KG/M2 | WEIGHT: 236 LBS

## 2018-10-01 DIAGNOSIS — R73.9 HYPERGLYCEMIA: ICD-10-CM

## 2018-10-01 DIAGNOSIS — I10 ESSENTIAL HYPERTENSION: ICD-10-CM

## 2018-10-01 DIAGNOSIS — E78.00 HIGH CHOLESTEROL: Primary | ICD-10-CM

## 2018-10-01 PROCEDURE — 99214 OFFICE O/P EST MOD 30 MIN: CPT | Performed by: NURSE PRACTITIONER

## 2018-10-01 RX ORDER — LOSARTAN POTASSIUM 50 MG/1
50 TABLET ORAL DAILY
Qty: 90 TABLET | Refills: 3 | Status: SHIPPED | OUTPATIENT
Start: 2018-10-01 | End: 2019-07-08 | Stop reason: SDUPTHER

## 2018-10-01 NOTE — PROGRESS NOTES
Subjective   Shaunna Carreno is a 57 y.o. female. Patient is here today for   Chief Complaint   Patient presents with   • Hyperlipidemia   • Hypertension          Vitals:    10/01/18 0804   BP: 142/76   Pulse: 98   Resp: 16   Temp: 98.2 °F (36.8 °C)   SpO2: 100%       The following portions of the patient's history were reviewed and updated as appropriate: allergies, current medications, past family history, past medical history, past social history, past surgical history and problem list.    Past Medical History:   Diagnosis Date   • Hyperlipidemia    • Hypertension       No Known Allergies   Social History     Social History   • Marital status:      Spouse name: N/A   • Number of children: N/A   • Years of education: N/A     Occupational History   • Not on file.     Social History Main Topics   • Smoking status: Never Smoker   • Smokeless tobacco: Never Used   • Alcohol use Yes   • Drug use: Unknown   • Sexual activity: Not on file     Other Topics Concern   • Not on file     Social History Narrative   • No narrative on file        Current Outpatient Prescriptions:   •  amLODIPine (NORVASC) 10 MG tablet, Take 1 tablet by mouth Daily., Disp: 30 tablet, Rfl: 3  •  atorvastatin (LIPITOR) 80 MG tablet, TAKE 1 TABLET EVERY NIGHT, Disp: 90 tablet, Rfl: 1  •  cetirizine (zyrTEC) 10 MG tablet, TAKE ONE tablet (by mouth) EACH DAY for ALLERGIES, Disp: 30 tablet, Rfl: 3  •  citalopram (CeleXA) 40 MG tablet, Take 1 tablet by mouth Daily., Disp: 90 tablet, Rfl: 1  •  furosemide (LASIX) 20 MG tablet, Take 1 tablet by mouth Daily., Disp: 90 tablet, Rfl: 1  •  gemfibrozil (LOPID) 600 MG tablet, Take 1 tablet by mouth 2 (Two) Times a Day Before Meals., Disp: 180 tablet, Rfl: 0  •  losartan (COZAAR) 50 MG tablet, Take 1 tablet by mouth Daily., Disp: 90 tablet, Rfl: 3  •  Probiotic Product (PROBIOTIC DAILY PO), Take  by mouth., Disp: , Rfl:      Objective   History of Present Illness    Patient is here for follow-up of  elevated blood pressure, hyperglycemia, and HLD . She is exercising some and is adherent to a low-salt diet. Blood pressure has improved at home but is still 140's systolic. Cardiac symptoms: none. Patient denies chest pain, chest pressure/discomfort, claudication, cough, dyspnea, exertional chest pressure/discomfort, fatigue, irregular heart beat, near-syncope, orthopnea, palpitations, paroxysmal nocturnal dyspnea, syncope, tachypnea and weight gain. Patient is adherent to medications and is not experiencing any side effects.   She does snore occasionally but there is no hypersomnia and no witnessed apnea   She overall is doing well. She has surgery on a ganglion cyst of her left hand.   I reviewed her labs with her and gave her a copt    Lab Requisition on 09/25/2018   Component Date Value Ref Range Status   • Case Report 09/25/2018    Final                    Value:Surgical Pathology Report                         Case: [id number]                                  Authorizing Provider:  Devang Quinteros MD  Collected:           09/25/2018 11:45 AM          Pathologist:           Xavier Bourgeois MD     Received:            09/25/2018 03:19 PM          Specimen:    Wrist, Volar mass- left wrist                                                             • Clinical Information 09/25/2018    Final                    Value:This result contains rich text formatting which cannot be displayed here.   • Final Diagnosis 09/25/2018    Final                    Value:This result contains rich text formatting which cannot be displayed here.   • Gross Description 09/25/2018    Final                    Value:This result contains rich text formatting which cannot be displayed here.   • Microscopic Description 09/25/2018    Final                    Value:This result contains rich text formatting which cannot be displayed here.   Lab on 09/19/2018   Component Date Value Ref Range Status   • Glucose 09/19/2018 112* 65 - 99  mg/dL Final   • BUN 09/19/2018 12  6 - 20 mg/dL Final   • Creatinine 09/19/2018 0.52* 0.57 - 1.00 mg/dL Final   • Sodium 09/19/2018 141  136 - 145 mmol/L Final   • Potassium 09/19/2018 3.7  3.5 - 5.2 mmol/L Final   • Chloride 09/19/2018 102  98 - 107 mmol/L Final   • CO2 09/19/2018 25.4  22.0 - 29.0 mmol/L Final   • Calcium 09/19/2018 10.0  8.6 - 10.5 mg/dL Final   • eGFR Non African Amer 09/19/2018 122  >60 mL/min/1.73 Final   • BUN/Creatinine Ratio 09/19/2018 23.1  7.0 - 25.0 Final   • Anion Gap 09/19/2018 13.6  mmol/L Final   Orders Only on 09/13/2018   Component Date Value Ref Range Status   • Glucose 09/14/2018 94  65 - 99 mg/dL Final   • BUN 09/14/2018 14  6 - 20 mg/dL Final   • Creatinine 09/14/2018 0.44* 0.57 - 1.00 mg/dL Final   • eGFR Non  Am 09/14/2018 148  >60 mL/min/1.73 Final   • eGFR African Am 09/14/2018 >150  >60 mL/min/1.73 Final   • BUN/Creatinine Ratio 09/14/2018 31.8* 7.0 - 25.0 Final   • Sodium 09/14/2018 142  136 - 145 mmol/L Final   • Potassium 09/14/2018 4.3  3.5 - 5.2 mmol/L Final   • Chloride 09/14/2018 100  98 - 107 mmol/L Final   • Total CO2 09/14/2018 27.4  22.0 - 29.0 mmol/L Final   • Calcium 09/14/2018 10.0  8.6 - 10.5 mg/dL Final   • Total Protein 09/14/2018 7.5  6.0 - 8.5 g/dL Final   • Albumin 09/14/2018 5.10  3.50 - 5.20 g/dL Final   • Globulin 09/14/2018 2.4  gm/dL Final   • A/G Ratio 09/14/2018 2.1  g/dL Final   • Total Bilirubin 09/14/2018 0.4  0.1 - 1.2 mg/dL Final   • Alkaline Phosphatase 09/14/2018 50  39 - 117 U/L Final   • AST (SGOT) 09/14/2018 37* 1 - 32 U/L Final   • ALT (SGPT) 09/14/2018 40* 1 - 33 U/L Final   • Total Cholesterol 09/14/2018 274* 0 - 200 mg/dL Final   • Triglycerides 09/14/2018 349* 0 - 150 mg/dL Final   • HDL Cholesterol 09/14/2018 80* 40 - 60 mg/dL Final   • VLDL Cholesterol 09/14/2018 69.8* 5 - 40 mg/dL Final   • LDL Cholesterol  09/14/2018 124* 0 - 100 mg/dL Final   • LDL/HDL Ratio 09/14/2018 1.55   Final   • WBC 09/14/2018 5.42  4.50 - 10.70  10*3/mm3 Final   • RBC 09/14/2018 3.90  3.90 - 5.20 10*6/mm3 Final   • Hemoglobin 09/14/2018 13.2  11.9 - 15.5 g/dL Final   • Hematocrit 09/14/2018 40.3  35.6 - 45.5 % Final   • MCV 09/14/2018 103.3* 80.5 - 98.2 fL Final   • MCH 09/14/2018 33.8* 26.9 - 32.0 pg Final   • MCHC 09/14/2018 32.8  32.4 - 36.3 g/dL Final   • RDW 09/14/2018 13.9* 11.7 - 13.0 % Final   • Platelets 09/14/2018 189  140 - 500 10*3/mm3 Final   • Neutrophil Rel % 09/14/2018 53.5  42.7 - 76.0 % Final   • Lymphocyte Rel % 09/14/2018 32.5  19.6 - 45.3 % Final   • Monocyte Rel % 09/14/2018 8.7  5.0 - 12.0 % Final   • Eosinophil Rel % 09/14/2018 3.7  0.3 - 6.2 % Final   • Basophil Rel % 09/14/2018 0.7  0.0 - 1.5 % Final   • Neutrophils Absolute 09/14/2018 2.90  1.90 - 8.10 10*3/mm3 Final   • Lymphocytes Absolute 09/14/2018 1.76  0.90 - 4.80 10*3/mm3 Final   • Monocytes Absolute 09/14/2018 0.47  0.20 - 1.20 10*3/mm3 Final   • Eosinophils Absolute 09/14/2018 0.20  0.00 - 0.70 10*3/mm3 Final   • Basophils Absolute 09/14/2018 0.04  0.00 - 0.20 10*3/mm3 Final   • Immature Granulocyte Rel % 09/14/2018 0.9* 0.0 - 0.5 % Final   • Immature Grans Absolute 09/14/2018 0.05* 0.00 - 0.03 10*3/mm3 Final       Review of Systems   Constitutional: Negative for chills, diaphoresis, fatigue and fever.   HENT: Negative.  Nosebleeds: mild, occurs at the end of the day     Eyes: Negative for visual disturbance.   Respiratory: Negative.    Cardiovascular: Positive for leg swelling. Negative for chest pain and palpitations.   Gastrointestinal: Negative.    Genitourinary: Negative.    Musculoskeletal: Negative for arthralgias.   Skin: Negative.    Neurological: Negative for dizziness.   Psychiatric/Behavioral: Negative for sleep disturbance.       Physical Exam   Constitutional: Vital signs are normal. She appears well-developed and well-nourished. She does not appear ill. No distress.   HENT:   Mouth/Throat: Uvula is midline, oropharynx is clear and moist and mucous membranes  are normal.   Cardiovascular: Normal rate, regular rhythm and normal heart sounds.    BP recheck 138/82   Pulmonary/Chest: Effort normal and breath sounds normal.   Musculoskeletal:        Right lower leg: She exhibits no edema.        Left lower leg: She exhibits no edema.   Neurological: She is alert.   Skin: Skin is warm, dry and intact.   Psychiatric: She has a normal mood and affect.       ASSESSMENT  Problem List Items Addressed This Visit     High cholesterol - Primary    Hyperglycemia    Hypertension    Relevant Medications    losartan (COZAAR) 50 MG tablet          PLAN  Her total cholesterol, trigs, and ldl are still elevated. She is on 80 of atorvastatin and lopid twice daily. We discussed low fat, heart healthy diet, exercise and weight loss  Her blood pressure needs better control so will increase losartan to 50mg daily. She will continue to monitor her BP at home  Her blood sugar is elevated as well, discussed cutting out sugar and carbohydrates  Will have her follow up in 6 months with annual physical will include hgb a1c, and tsh at at that time  She will monitor her BP at home and follow up if it is consistently 140/90  Flu vaccine today   Return in about 6 months (around 4/1/2019) for Annual physical no ekg or chest xray .

## 2018-10-25 RX ORDER — FUROSEMIDE 20 MG/1
TABLET ORAL
Qty: 90 TABLET | Refills: 1 | Status: SHIPPED | OUTPATIENT
Start: 2018-10-25 | End: 2019-04-16 | Stop reason: SDUPTHER

## 2018-10-25 RX ORDER — CITALOPRAM 40 MG/1
TABLET ORAL
Qty: 90 TABLET | Refills: 1 | Status: SHIPPED | OUTPATIENT
Start: 2018-10-25 | End: 2019-04-16 | Stop reason: SDUPTHER

## 2018-12-18 NOTE — TELEPHONE ENCOUNTER
---looked at medication and there are 3 refills per danay pt does not need to be reordered       -- Message from Waleska Yanez sent at 12/18/2018  9:38 AM EST -----  PT SAID HER PHARMACY HAS TRIED TO CONTACT US ABOUT HER LOSATIN 50 MG. IT IS JUST A MESSAGE SAYING THEY HAVE TRIED TO CONTACT US SEVERAL TIMES BUT NOT WHY.    [phone] PLEASE CALL WITH ANY QUESTIONS    I LOOKED IN HER NOTES ETC AND DIDN'T SEE ANYTHING

## 2018-12-21 ENCOUNTER — TELEPHONE (OUTPATIENT)
Dept: FAMILY MEDICINE CLINIC | Facility: CLINIC | Age: 58
End: 2018-12-21

## 2018-12-21 NOTE — TELEPHONE ENCOUNTER
LOOKED IN SHAYY'S NOTES AND SHE INCREASED IT TO 50 MG CALLED THEM BACK AND NOTIFIED THEM       ----- Message from Rain Christianson sent at 12/20/2018  3:54 PM EST -----  [facility]     Clarification request on losartan         Losartan 25mg       Losartan  50mg losartan       Which rx is pt suppose to  Be taking     Please call [facility]    [phone]  Ref # [id number]

## 2019-03-13 RX ORDER — CETIRIZINE HYDROCHLORIDE 10 MG/1
TABLET ORAL
Qty: 30 TABLET | Refills: 0 | Status: SHIPPED | OUTPATIENT
Start: 2019-03-13 | End: 2019-04-26 | Stop reason: SDUPTHER

## 2019-03-25 RX ORDER — ATORVASTATIN CALCIUM 80 MG/1
TABLET, FILM COATED ORAL
Qty: 90 TABLET | Refills: 1 | Status: SHIPPED | OUTPATIENT
Start: 2019-03-25 | End: 2019-04-17 | Stop reason: ALTCHOICE

## 2019-04-02 DIAGNOSIS — R73.9 ELEVATED BLOOD SUGAR: ICD-10-CM

## 2019-04-02 DIAGNOSIS — Z00.00 ROUTINE HEALTH MAINTENANCE: Primary | ICD-10-CM

## 2019-04-02 DIAGNOSIS — I10 ESSENTIAL HYPERTENSION: ICD-10-CM

## 2019-04-02 DIAGNOSIS — F41.9 ANXIETY: ICD-10-CM

## 2019-04-10 ENCOUNTER — CLINICAL SUPPORT (OUTPATIENT)
Dept: FAMILY MEDICINE CLINIC | Facility: CLINIC | Age: 59
End: 2019-04-10

## 2019-04-10 DIAGNOSIS — Z00.00 ROUTINE HEALTH MAINTENANCE: Primary | ICD-10-CM

## 2019-04-10 PROCEDURE — 85025 COMPLETE CBC W/AUTO DIFF WBC: CPT | Performed by: NURSE PRACTITIONER

## 2019-04-11 LAB
ALBUMIN SERPL-MCNC: 5 G/DL (ref 3.5–5.2)
ALBUMIN/GLOB SERPL: 2 G/DL
ALP SERPL-CCNC: 47 U/L (ref 39–117)
ALT SERPL-CCNC: 43 U/L (ref 1–33)
APPEARANCE UR: CLEAR
AST SERPL-CCNC: 46 U/L (ref 1–32)
BACTERIA #/AREA URNS HPF: NORMAL /HPF
BILIRUB SERPL-MCNC: 0.6 MG/DL (ref 0.2–1.2)
BILIRUB UR QL STRIP: NEGATIVE
BUN SERPL-MCNC: 8 MG/DL (ref 6–20)
BUN/CREAT SERPL: 14.5 (ref 7–25)
CALCIUM SERPL-MCNC: 9.8 MG/DL (ref 8.6–10.5)
CASTS URNS MICRO: NORMAL
CHLORIDE SERPL-SCNC: 99 MMOL/L (ref 98–107)
CHOLEST SERPL-MCNC: 253 MG/DL (ref 0–200)
CO2 SERPL-SCNC: 27.6 MMOL/L (ref 22–29)
COLOR UR: YELLOW
CREAT SERPL-MCNC: 0.55 MG/DL (ref 0.57–1)
EPI CELLS #/AREA URNS HPF: NORMAL /HPF
GLOBULIN SER CALC-MCNC: 2.5 GM/DL
GLUCOSE SERPL-MCNC: 106 MG/DL (ref 65–99)
GLUCOSE UR QL: NEGATIVE
HBA1C MFR BLD: 4.8 % (ref 4.8–5.6)
HDLC SERPL-MCNC: 68 MG/DL (ref 40–60)
HGB UR QL STRIP: NEGATIVE
KETONES UR QL STRIP: NEGATIVE
LDLC SERPL CALC-MCNC: 114 MG/DL (ref 0–100)
LDLC/HDLC SERPL: 1.68 {RATIO}
LEUKOCYTE ESTERASE UR QL STRIP: NEGATIVE
NITRITE UR QL STRIP: NEGATIVE
PH UR STRIP: 6.5 [PH] (ref 5–8)
POTASSIUM SERPL-SCNC: 3.7 MMOL/L (ref 3.5–5.2)
PROT SERPL-MCNC: 7.5 G/DL (ref 6–8.5)
PROT UR QL STRIP: ABNORMAL
RBC #/AREA URNS HPF: NORMAL /HPF
SODIUM SERPL-SCNC: 140 MMOL/L (ref 136–145)
SP GR UR: 1.01 (ref 1–1.03)
TRIGL SERPL-MCNC: 353 MG/DL (ref 0–150)
TSH SERPL DL<=0.005 MIU/L-ACNC: 5.58 MIU/ML (ref 0.27–4.2)
UROBILINOGEN UR STRIP-MCNC: ABNORMAL MG/DL
VLDLC SERPL CALC-MCNC: 70.6 MG/DL
WBC #/AREA URNS HPF: NORMAL /HPF

## 2019-04-16 RX ORDER — AMLODIPINE BESYLATE 10 MG/1
TABLET ORAL
Qty: 90 TABLET | Refills: 1 | Status: SHIPPED | OUTPATIENT
Start: 2019-04-16 | End: 2019-09-17 | Stop reason: SDUPTHER

## 2019-04-16 RX ORDER — FUROSEMIDE 20 MG/1
TABLET ORAL
Qty: 90 TABLET | Refills: 1 | Status: SHIPPED | OUTPATIENT
Start: 2019-04-16 | End: 2019-09-17 | Stop reason: SDUPTHER

## 2019-04-16 RX ORDER — CITALOPRAM 40 MG/1
TABLET ORAL
Qty: 90 TABLET | Refills: 1 | Status: SHIPPED | OUTPATIENT
Start: 2019-04-16 | End: 2019-09-17 | Stop reason: SDUPTHER

## 2019-04-17 ENCOUNTER — OFFICE VISIT (OUTPATIENT)
Dept: FAMILY MEDICINE CLINIC | Facility: CLINIC | Age: 59
End: 2019-04-17

## 2019-04-17 VITALS
BODY MASS INDEX: 38.14 KG/M2 | DIASTOLIC BLOOD PRESSURE: 82 MMHG | SYSTOLIC BLOOD PRESSURE: 140 MMHG | WEIGHT: 243 LBS | OXYGEN SATURATION: 98 % | RESPIRATION RATE: 16 BRPM | HEART RATE: 83 BPM | TEMPERATURE: 98.6 F | HEIGHT: 67 IN

## 2019-04-17 DIAGNOSIS — I10 ESSENTIAL HYPERTENSION: ICD-10-CM

## 2019-04-17 DIAGNOSIS — E78.00 HIGH CHOLESTEROL: ICD-10-CM

## 2019-04-17 DIAGNOSIS — Z00.00 ANNUAL PHYSICAL EXAM: Primary | ICD-10-CM

## 2019-04-17 DIAGNOSIS — E66.9 OBESITY (BMI 30-39.9): ICD-10-CM

## 2019-04-17 DIAGNOSIS — R73.9 HYPERGLYCEMIA: ICD-10-CM

## 2019-04-17 DIAGNOSIS — R79.89 ELEVATED TSH: ICD-10-CM

## 2019-04-17 PROCEDURE — 99396 PREV VISIT EST AGE 40-64: CPT | Performed by: NURSE PRACTITIONER

## 2019-04-17 RX ORDER — ROSUVASTATIN CALCIUM 40 MG/1
40 TABLET, COATED ORAL NIGHTLY
Qty: 30 TABLET | Refills: 3 | Status: SHIPPED | OUTPATIENT
Start: 2019-04-17 | End: 2019-09-17 | Stop reason: SDUPTHER

## 2019-04-17 NOTE — PATIENT INSTRUCTIONS
Recommend vascular screenings  Start crestor  Monitor BP at home and call if >130/90  Exercise  Follow up in 3 months with fasting labs

## 2019-04-17 NOTE — PROGRESS NOTES
Subjective   Shaunna Carreno is a 58 y.o. female. Patient is here today for   Chief Complaint   Patient presents with   • Annual Exam          Vitals:    04/17/19 0800   BP: 140/82   Pulse: 83   Resp: 16   Temp: 98.6 °F (37 °C)   SpO2: 98%       The following portions of the patient's history were reviewed and updated as appropriate: allergies, current medications, past family history, past medical history, past social history, past surgical history and problem list.    Past Medical History:   Diagnosis Date   • Hyperlipidemia    • Hypertension       No Known Allergies   Social History     Socioeconomic History   • Marital status:      Spouse name: Not on file   • Number of children: Not on file   • Years of education: Not on file   • Highest education level: Not on file   Tobacco Use   • Smoking status: Never Smoker   • Smokeless tobacco: Never Used   Substance and Sexual Activity   • Alcohol use: Yes     Comment: 1-2 GLASSES OF WINE A NIGHT   • Drug use: Defer   • Sexual activity: Defer        Current Outpatient Medications:   •  amLODIPine (NORVASC) 10 MG tablet, TAKE 1 TABLET DAILY, Disp: 90 tablet, Rfl: 1  •  cetirizine (zyrTEC) 10 MG tablet, TAKE ONE tablet (by mouth) EACH DAY for ALLERGIES, Disp: 30 tablet, Rfl: 0  •  citalopram (CeleXA) 40 MG tablet, TAKE 1 TABLET DAILY, Disp: 90 tablet, Rfl: 1  •  furosemide (LASIX) 20 MG tablet, TAKE 1 TABLET DAILY, Disp: 90 tablet, Rfl: 1  •  losartan (COZAAR) 50 MG tablet, Take 1 tablet by mouth Daily., Disp: 90 tablet, Rfl: 3  •  Probiotic Product (PROBIOTIC DAILY PO), Take  by mouth., Disp: , Rfl:   •  rosuvastatin (CRESTOR) 40 MG tablet, Take 1 tablet by mouth Every Night., Disp: 30 tablet, Rfl: 3     EKG  ECG Report reviewed last ekg from 9/2018 in epic     Objective   History of Present Illness   hSaunna Carreno 58 y.o. female who presents for an Annual Wellness Visit.  she has a history of   Patient Active Problem List   Diagnosis   • Colitis, nonspecific    • Diverticulosis   • High cholesterol   • Hyperglycemia   • Hypertension   • Obesity (BMI 30-39.9)   • Pulmonary hypertension (CMS/HCC)   .  she has been doing well with new interval problems.  Labs results discussed in detail with the patient and gave her a copy.  Plan to update vaccines if needed today. She monitors her blood pressure at home and it has been 130-140/80. She is compliant with her medications and she is not experiencing any side effects     Health Habits:  Dental Exam. not up to date - discussed importance of making an appointment   Eye Exam. up to date  Exercise: 0 times/week.  She eats an overall healthy diet   Current exercise activities include: none  [facility]         Lab Results (most recent)     Orders Only on 04/02/2019   Component Date Value Ref Range Status   • Glucose 04/10/2019 106* 65 - 99 mg/dL Final   • BUN 04/10/2019 8  6 - 20 mg/dL Final   • Creatinine 04/10/2019 0.55* 0.57 - 1.00 mg/dL Final   • eGFR Non African Am 04/10/2019 114  >60 mL/min/1.73 Final   • eGFR African Am 04/10/2019 139  >60 mL/min/1.73 Final   • BUN/Creatinine Ratio 04/10/2019 14.5  7.0 - 25.0 Final   • Sodium 04/10/2019 140  136 - 145 mmol/L Final   • Potassium 04/10/2019 3.7  3.5 - 5.2 mmol/L Final   • Chloride 04/10/2019 99  98 - 107 mmol/L Final   • Total CO2 04/10/2019 27.6  22.0 - 29.0 mmol/L Final   • Calcium 04/10/2019 9.8  8.6 - 10.5 mg/dL Final   • Total Protein 04/10/2019 7.5  6.0 - 8.5 g/dL Final   • Albumin 04/10/2019 5.00  3.50 - 5.20 g/dL Final   • Globulin 04/10/2019 2.5  gm/dL Final   • A/G Ratio 04/10/2019 2.0  g/dL Final   • Total Bilirubin 04/10/2019 0.6  0.2 - 1.2 mg/dL Final   • Alkaline Phosphatase 04/10/2019 47  39 - 117 U/L Final   • AST (SGOT) 04/10/2019 46* 1 - 32 U/L Final   • ALT (SGPT) 04/10/2019 43* 1 - 33 U/L Final   • Total Cholesterol 04/10/2019 253* 0 - 200 mg/dL Final   • Triglycerides 04/10/2019 353* 0 - 150 mg/dL Final   • HDL Cholesterol 04/10/2019 68* 40 - 60  mg/dL Final   • VLDL Cholesterol 04/10/2019 70.6  mg/dL Final   • LDL Cholesterol  04/10/2019 114* 0 - 100 mg/dL Final   • LDL/HDL Ratio 04/10/2019 1.68   Final   • Specific Gravity, UA 04/10/2019 1.011  1.005 - 1.030 Final   • pH, UA 04/10/2019 6.5  5.0 - 8.0 Final   • Color, UA 04/10/2019 Yellow   Final    REFERENCE RANGE: Yellow, Straw   • Appearance, UA 04/10/2019 Clear  Clear Final   • Leukocytes, UA 04/10/2019 Negative  Negative Final   • Protein 04/10/2019 See below:* Negative Final    30 mg/dL (1+)   • Glucose, UA 04/10/2019 Negative  Negative Final   • Ketones 04/10/2019 Negative  Negative Final   • Blood, UA 04/10/2019 Negative  Negative Final   • Bilirubin, UA 04/10/2019 Negative  Negative Final   • Urobilinogen, UA 04/10/2019 Comment   Final    Comment: 0.2 E.U./dL  REFERENCE RANGE: 0.2 - 1.0 E.U./dL     • Nitrite, UA 04/10/2019 Negative  Negative Final   • Hemoglobin A1C 04/10/2019 4.80  4.80 - 5.60 % Final    Comment: Hemoglobin A1C Ranges:  Increased Risk for Diabetes  5.7% to 6.4%  Diabetes                     >= 6.5%  Diabetic Goal                < 7.0%     • TSH 04/10/2019 5.580* 0.270 - 4.200 mIU/mL Final   • WBC, UA 04/10/2019 0-2  /HPF Final    REFERENCE RANGE: None Seen, 0-2   • RBC, UA 04/10/2019 0-2  /HPF Final    REFERENCE RANGE: None Seen, 0-2   • Epithelial Cells (non renal) 04/10/2019 0-2  /HPF Final    REFERENCE RANGE: None Seen, 0-2   • Cast Type 04/10/2019 Comment   Final    HYALINE CASTS, UA            None Seen        /LPF       None Seen  N   • Bacteria, UA 04/10/2019 Comment  None Seen /HPF Final    None Seen                 Review of Systems   Constitutional: Positive for fatigue.   HENT: Positive for congestion and sore throat.    Eyes: Negative for visual disturbance.   Respiratory: Positive for cough. Negative for chest tightness, shortness of breath and wheezing.    Cardiovascular: Negative for chest pain, palpitations and leg swelling.   Gastrointestinal: Positive for  diarrhea (has chrons ). Negative for anal bleeding, blood in stool, rectal pain and vomiting.   Genitourinary: Negative for dysuria, vaginal bleeding, vaginal discharge and vaginal pain.   Musculoskeletal: Negative for arthralgias.   Psychiatric/Behavioral: Positive for sleep disturbance (often wakes up at 3am and can't get back to sleep ). Negative for dysphoric mood. The patient is not nervous/anxious.        Physical Exam   Constitutional: She is oriented to person, place, and time. Vital signs are normal. She appears well-developed and well-nourished. She does not appear ill. No distress.   HENT:   Head: Normocephalic.   Nose: Mucosal edema (nasal mucosa inflammed ) present.   Mouth/Throat: Uvula is midline and mucous membranes are normal. Normal dentition. Posterior oropharyngeal erythema present.   Cerumen bilaterally obscuring TM    Eyes: Conjunctivae and lids are normal. Pupils are equal, round, and reactive to light.   Wears glasses    Neck: Trachea normal and normal range of motion. Neck supple. Carotid bruit is not present. No thyroid mass and no thyromegaly present.   Cardiovascular: Normal rate, regular rhythm and normal heart sounds.   Pulmonary/Chest: Effort normal and breath sounds normal.   Abdominal: Soft. Normal appearance and bowel sounds are normal. There is no tenderness.   Genitourinary:   Genitourinary Comments: Deferred    Musculoskeletal: Normal range of motion.   Neurological: She is alert and oriented to person, place, and time. Gait normal.   Skin: Skin is warm, dry and intact.   Psychiatric: She has a normal mood and affect.   Nursing note and vitals reviewed.      ASSESSMENT       Problem List Items Addressed This Visit     High cholesterol    Relevant Medications    rosuvastatin (CRESTOR) 40 MG tablet    Hyperglycemia    Hypertension    Obesity (BMI 30-39.9)      Other Visit Diagnoses     Annual physical exam    -  Primary    Elevated TSH              PLAN  1. HLD- not well controlled  on current medications, she has a strong family history of HLD, will DC lopid and atorvastatin, will start crestor, may add zetia. I gave her a list of low cholesterol foods  2. HTN- well controlled at home. BP is elevated today, continue to monitor, and call if >130/90 and will increase losartan to BID  3. Elevated TSH- has increased fatigue but is sleeping well. Will recheck in 3 months  4. Hyperglycemia- A1c is 4.8 fasting glucose is 106, discussed dietary changes and weight loss  5. HM- will schedule vascular screenings, needs second shingrix  Follow up in 3 months with lipid panel, cmp, tsh, free t4, TPA, thyroglobulin with anti-tg   Patient Instructions   Recommend vascular screenings  Start crestor  Monitor BP at home and call if >130/90  Exercise  Follow up in 3 months with fasting labs     AVS given to the patient with instructions   Return in about 3 months (around 7/17/2019) for with labs.

## 2019-04-26 RX ORDER — CETIRIZINE HYDROCHLORIDE 10 MG/1
TABLET ORAL
Qty: 30 TABLET | Refills: 0 | Status: SHIPPED | OUTPATIENT
Start: 2019-04-26 | End: 2019-06-18 | Stop reason: SDUPTHER

## 2019-06-18 RX ORDER — CETIRIZINE HYDROCHLORIDE 10 MG/1
TABLET ORAL
Qty: 30 TABLET | Refills: 3 | Status: SHIPPED | OUTPATIENT
Start: 2019-06-18 | End: 2021-07-12

## 2019-07-08 ENCOUNTER — TELEPHONE (OUTPATIENT)
Dept: FAMILY MEDICINE CLINIC | Facility: CLINIC | Age: 59
End: 2019-07-08

## 2019-07-08 RX ORDER — LOSARTAN POTASSIUM 50 MG/1
50 TABLET ORAL DAILY
Qty: 10 TABLET | Refills: 0 | Status: SHIPPED | OUTPATIENT
Start: 2019-07-08 | End: 2019-07-08 | Stop reason: SDUPTHER

## 2019-07-08 RX ORDER — LOSARTAN POTASSIUM 50 MG/1
50 TABLET ORAL DAILY
Qty: 90 TABLET | Refills: 1 | Status: SHIPPED | OUTPATIENT
Start: 2019-07-08 | End: 2021-03-22 | Stop reason: SDUPTHER

## 2019-07-08 NOTE — TELEPHONE ENCOUNTER
Spoke to patient and informed her that the prescriptions were sent to the pharmacies   ----- Message from Irasema De Paz sent at 7/8/2019 12:50 PM EDT -----  REFILL ON     losartan (COZAAR) 50 MG tablet 1QD [facility]    losartan (COZAAR) 50 MG tablet 1QD QTY: 90 TO [facility] MAIL ORDER    PLEASE CALL PT BACK WITH ISSUES [phone]    THANK YOU

## 2019-07-24 DIAGNOSIS — R79.89 ELEVATED TSH: ICD-10-CM

## 2019-07-24 DIAGNOSIS — Z79.899 LONG TERM USE OF DRUG: ICD-10-CM

## 2019-07-24 DIAGNOSIS — E78.00 HIGH CHOLESTEROL: Primary | ICD-10-CM

## 2019-07-24 DIAGNOSIS — I10 ESSENTIAL HYPERTENSION: ICD-10-CM

## 2019-07-24 DIAGNOSIS — R73.9 HYPERGLYCEMIA: ICD-10-CM

## 2019-07-25 ENCOUNTER — RESULTS ENCOUNTER (OUTPATIENT)
Dept: FAMILY MEDICINE CLINIC | Facility: CLINIC | Age: 59
End: 2019-07-25

## 2019-07-25 DIAGNOSIS — I10 ESSENTIAL HYPERTENSION: ICD-10-CM

## 2019-07-25 DIAGNOSIS — R73.9 HYPERGLYCEMIA: ICD-10-CM

## 2019-07-25 DIAGNOSIS — Z79.899 LONG TERM USE OF DRUG: ICD-10-CM

## 2019-07-25 DIAGNOSIS — E78.00 HIGH CHOLESTEROL: ICD-10-CM

## 2019-07-25 DIAGNOSIS — R79.89 ELEVATED TSH: ICD-10-CM

## 2019-07-30 DIAGNOSIS — R79.89 ELEVATED TSH: Primary | ICD-10-CM

## 2019-07-31 LAB
ALBUMIN SERPL-MCNC: 4.6 G/DL (ref 3.5–5.2)
ALBUMIN/GLOB SERPL: 1.6 G/DL
ALP SERPL-CCNC: 46 U/L (ref 39–117)
ALT SERPL-CCNC: 27 U/L (ref 1–33)
AST SERPL-CCNC: 37 U/L (ref 1–32)
BILIRUB SERPL-MCNC: 0.5 MG/DL (ref 0.2–1.2)
BUN SERPL-MCNC: 8 MG/DL (ref 6–20)
BUN/CREAT SERPL: 12.9 (ref 7–25)
CALCIUM SERPL-MCNC: 9.7 MG/DL (ref 8.6–10.5)
CHLORIDE SERPL-SCNC: 100 MMOL/L (ref 98–107)
CHOLEST SERPL-MCNC: 181 MG/DL (ref 0–200)
CO2 SERPL-SCNC: 27.9 MMOL/L (ref 22–29)
CREAT SERPL-MCNC: 0.62 MG/DL (ref 0.57–1)
GLOBULIN SER CALC-MCNC: 2.8 GM/DL
GLUCOSE SERPL-MCNC: 100 MG/DL (ref 65–99)
HDLC SERPL-MCNC: 73 MG/DL (ref 40–60)
LDLC SERPL CALC-MCNC: 55 MG/DL (ref 0–100)
LDLC/HDLC SERPL: 0.75 {RATIO}
POTASSIUM SERPL-SCNC: 4.4 MMOL/L (ref 3.5–5.2)
PROT SERPL-MCNC: 7.4 G/DL (ref 6–8.5)
SODIUM SERPL-SCNC: 144 MMOL/L (ref 136–145)
T4 FREE SERPL-MCNC: 1.08 NG/DL (ref 0.93–1.7)
THYROGLOB AB SERPL-ACNC: <1 IU/ML (ref 0–0.9)
THYROGLOB SERPL-MCNC: 25 NG/ML (ref 1.5–38.5)
THYROPEROXIDASE AB SERPL-ACNC: 12 IU/ML (ref 0–34)
TRIGL SERPL-MCNC: 267 MG/DL (ref 0–150)
TSH SERPL DL<=0.005 MIU/L-ACNC: 3.12 MIU/ML (ref 0.27–4.2)
VLDLC SERPL CALC-MCNC: 53.4 MG/DL

## 2019-09-17 ENCOUNTER — OFFICE VISIT (OUTPATIENT)
Dept: FAMILY MEDICINE CLINIC | Facility: CLINIC | Age: 59
End: 2019-09-17

## 2019-09-17 ENCOUNTER — TELEPHONE (OUTPATIENT)
Dept: FAMILY MEDICINE CLINIC | Facility: CLINIC | Age: 59
End: 2019-09-17

## 2019-09-17 VITALS
HEIGHT: 66 IN | OXYGEN SATURATION: 99 % | TEMPERATURE: 99 F | SYSTOLIC BLOOD PRESSURE: 144 MMHG | WEIGHT: 244 LBS | DIASTOLIC BLOOD PRESSURE: 70 MMHG | BODY MASS INDEX: 39.21 KG/M2 | HEART RATE: 70 BPM | RESPIRATION RATE: 18 BRPM

## 2019-09-17 DIAGNOSIS — R73.9 HYPERGLYCEMIA: ICD-10-CM

## 2019-09-17 DIAGNOSIS — Z23 NEED FOR INFLUENZA VACCINATION: ICD-10-CM

## 2019-09-17 DIAGNOSIS — F41.9 ANXIETY: ICD-10-CM

## 2019-09-17 DIAGNOSIS — E78.00 HIGH CHOLESTEROL: Primary | ICD-10-CM

## 2019-09-17 DIAGNOSIS — E66.9 OBESITY (BMI 30-39.9): ICD-10-CM

## 2019-09-17 DIAGNOSIS — I10 ESSENTIAL HYPERTENSION: ICD-10-CM

## 2019-09-17 PROCEDURE — 99214 OFFICE O/P EST MOD 30 MIN: CPT | Performed by: NURSE PRACTITIONER

## 2019-09-17 PROCEDURE — 90471 IMMUNIZATION ADMIN: CPT | Performed by: NURSE PRACTITIONER

## 2019-09-17 PROCEDURE — 90674 CCIIV4 VAC NO PRSV 0.5 ML IM: CPT | Performed by: NURSE PRACTITIONER

## 2019-09-17 RX ORDER — FUROSEMIDE 20 MG/1
20 TABLET ORAL 2 TIMES DAILY
Qty: 180 TABLET | Refills: 1 | Status: SHIPPED | OUTPATIENT
Start: 2019-09-17 | End: 2019-09-18 | Stop reason: SDUPTHER

## 2019-09-17 RX ORDER — CITALOPRAM 40 MG/1
40 TABLET ORAL DAILY
Qty: 90 TABLET | Refills: 1 | Status: SHIPPED | OUTPATIENT
Start: 2019-09-17 | End: 2019-09-18 | Stop reason: SDUPTHER

## 2019-09-17 RX ORDER — ROSUVASTATIN CALCIUM 40 MG/1
40 TABLET, COATED ORAL NIGHTLY
Qty: 90 TABLET | Refills: 3 | Status: SHIPPED | OUTPATIENT
Start: 2019-09-17 | End: 2019-09-18 | Stop reason: SDUPTHER

## 2019-09-17 RX ORDER — AMLODIPINE BESYLATE 10 MG/1
10 TABLET ORAL DAILY
Qty: 90 TABLET | Refills: 1 | Status: SHIPPED | OUTPATIENT
Start: 2019-09-17 | End: 2019-09-18 | Stop reason: SDUPTHER

## 2019-09-17 NOTE — TELEPHONE ENCOUNTER
CALLED PATIENT LEFT DETAILED VOICEMAIL.   PER SHAYY HARRIS, PATIENT NEEDS BMP CHECKED DUE TO INCREASE IN FUROSEMIDE.     PLEASE SCHEDULE FOR NON-FASTING LAB

## 2019-09-17 NOTE — PROGRESS NOTES
Subjective   Shaunna Carreno is a 58 y.o. female. Patient is here today for   Chief Complaint   Patient presents with   • Hyperlipidemia   • Hypertension          Vitals:    09/17/19 1245   BP: 144/70   Pulse: 70   Resp: 18   Temp: 99 °F (37.2 °C)   SpO2: 99%       The following portions of the patient's history were reviewed and updated as appropriate: allergies, current medications, past family history, past medical history, past social history, past surgical history and problem list.    Past Medical History:   Diagnosis Date   • Hyperlipidemia    • Hypertension       No Known Allergies   Social History     Socioeconomic History   • Marital status:      Spouse name: Not on file   • Number of children: Not on file   • Years of education: Not on file   • Highest education level: Not on file   Tobacco Use   • Smoking status: Never Smoker   • Smokeless tobacco: Never Used   Substance and Sexual Activity   • Alcohol use: Yes     Comment: 1-2 GLASSES OF WINE A NIGHT   • Drug use: Defer   • Sexual activity: Defer        Current Outpatient Medications:   •  amLODIPine (NORVASC) 10 MG tablet, Take 1 tablet by mouth Daily., Disp: 90 tablet, Rfl: 1  •  cetirizine (zyrTEC) 10 MG tablet, TAKE ONE tablet (by mouth) EACH DAY for ALLERGIES, Disp: 30 tablet, Rfl: 3  •  citalopram (CeleXA) 40 MG tablet, Take 1 tablet by mouth Daily., Disp: 90 tablet, Rfl: 1  •  furosemide (LASIX) 20 MG tablet, Take 1 tablet by mouth 2 (Two) Times a Day., Disp: 180 tablet, Rfl: 1  •  losartan (COZAAR) 50 MG tablet, Take 1 tablet by mouth Daily., Disp: 90 tablet, Rfl: 1  •  Probiotic Product (PROBIOTIC DAILY PO), Take  by mouth., Disp: , Rfl:   •  rosuvastatin (CRESTOR) 40 MG tablet, Take 1 tablet by mouth Every Night., Disp: 90 tablet, Rfl: 3     Objective   History of Present Illness  Shaunna is here for a lab follow up.She has ahistory of HTN, HLD,. She overall is doing well. She is compliant her medication. She has been walking at work, and  eating an overall healthy diet. She has had some lower extremity edema that is worse in the afternoon and evening after working all day. I reviewed her labs with her and gave her a copy    No visits with results within 1 Month(s) from this visit.   Latest known visit with results is:   Results Encounter on 07/25/2019   Component Date Value Ref Range Status   • Total Cholesterol 07/30/2019 181  0 - 200 mg/dL Final   • Triglycerides 07/30/2019 267* 0 - 150 mg/dL Final   • HDL Cholesterol 07/30/2019 73* 40 - 60 mg/dL Final   • VLDL Cholesterol 07/30/2019 53.4  mg/dL Final   • LDL Cholesterol  07/30/2019 55  0 - 100 mg/dL Final   • LDL/HDL Ratio 07/30/2019 0.75   Final   • Glucose 07/30/2019 100* 65 - 99 mg/dL Final   • BUN 07/30/2019 8  6 - 20 mg/dL Final   • Creatinine 07/30/2019 0.62  0.57 - 1.00 mg/dL Final   • eGFR Non African Am 07/30/2019 100  >60 mL/min/1.73 Final   • eGFR African Am 07/30/2019 121  >60 mL/min/1.73 Final   • BUN/Creatinine Ratio 07/30/2019 12.9  7.0 - 25.0 Final   • Sodium 07/30/2019 144  136 - 145 mmol/L Final   • Potassium 07/30/2019 4.4  3.5 - 5.2 mmol/L Final   • Chloride 07/30/2019 100  98 - 107 mmol/L Final   • Total CO2 07/30/2019 27.9  22.0 - 29.0 mmol/L Final   • Calcium 07/30/2019 9.7  8.6 - 10.5 mg/dL Final   • Total Protein 07/30/2019 7.4  6.0 - 8.5 g/dL Final   • Albumin 07/30/2019 4.60  3.50 - 5.20 g/dL Final   • Globulin 07/30/2019 2.8  gm/dL Final   • A/G Ratio 07/30/2019 1.6  g/dL Final   • Total Bilirubin 07/30/2019 0.5  0.2 - 1.2 mg/dL Final   • Alkaline Phosphatase 07/30/2019 46  39 - 117 U/L Final   • AST (SGOT) 07/30/2019 37* 1 - 32 U/L Final   • ALT (SGPT) 07/30/2019 27  1 - 33 U/L Final   • TSH 07/30/2019 3.120  0.270 - 4.200 mIU/mL Final   • Free T4 07/30/2019 1.08  0.93 - 1.70 ng/dL Final   • Thyroglobulin Ab 07/30/2019 <1.0  0.0 - 0.9 IU/mL Final    Thyroglobulin Antibody measured by [facility]   • THYROGLOBULIN BY JUDY 07/30/2019 25.0  1.5 - 38.5 ng/mL  Final    Comment: According to the National Academy of Clinical Biochemistry, the  reference interval for Thyroglobulin (TG) should be related to  euthyroid patients and not for patients who underwent thyroidectomy.  TG reference intervals for these patients depend on the residual  mass of the thyroid tissue left after surgery. Establishing a  post-operative baseline is recommended.  The assay limit of quantitation is 0.1 ng/mL  Thyroglobulin measured by [facility] [place] [facility]     • Thyroid Peroxidase Antibody 07/30/2019 12  0 - 34 IU/mL Final         Review of Systems   Constitutional: Negative for fatigue.   HENT: Negative.    Eyes: Negative for visual disturbance.   Respiratory: Negative for cough, chest tightness, shortness of breath and wheezing.    Cardiovascular: Negative for chest pain, palpitations and leg swelling.   Gastrointestinal: Negative.    Musculoskeletal: Negative for arthralgias.   Neurological: Negative for dizziness, tremors, weakness and headaches.   Psychiatric/Behavioral:        Anxiety is well  Controlled on citalopram        Physical Exam   Constitutional: Vital signs are normal. She appears well-developed and well-nourished. She does not appear ill. No distress.   HENT:   Head: Normocephalic.   Mouth/Throat: Oropharynx is clear and moist and mucous membranes are normal.   Cardiovascular: Normal rate, regular rhythm and normal heart sounds.   BP recheck 138/72   Pulmonary/Chest: Effort normal and breath sounds normal.   Musculoskeletal:   1_ lower extremity edema bilaterally    Neurological: She is alert.   Skin: Skin is warm and dry.       ASSESSMENT  Problem List Items Addressed This Visit     High cholesterol - Primary    Relevant Medications    rosuvastatin (CRESTOR) 40 MG tablet    Hyperglycemia    Hypertension    Relevant Medications    furosemide (LASIX) 20 MG tablet    amLODIPine (NORVASC) 10 MG tablet    Obesity (BMI 30-39.9)    Anxiety      Other Visit Diagnoses      Need for influenza vaccination        Relevant Orders    Flucelvax Quad=>4Years (PFS) (Completed)          PLAN  Her blood pressure is well controlled   Cholesterol has improved on rosuvastatin , triglycerides are elevated at 267 but has improved from 353  Will increase lasix to 40mg daily and recheck bmp in one month.  Her tsh was elevated in the past, hashimoto's antibodies are negative and tsh is normal  Continue with diet and increase exercise  Follow up in one month with bmp, and 6 months with lipid panel, cmp, cbc, tsh, and UA  She will get the flu vaccine today   Return in about 6 months (around 3/17/2020) for with labs.

## 2019-09-18 RX ORDER — CITALOPRAM 40 MG/1
40 TABLET ORAL DAILY
Qty: 90 TABLET | Refills: 1 | Status: SHIPPED | OUTPATIENT
Start: 2019-09-18 | End: 2020-04-06

## 2019-09-18 RX ORDER — AMLODIPINE BESYLATE 10 MG/1
10 TABLET ORAL DAILY
Qty: 90 TABLET | Refills: 1 | Status: SHIPPED | OUTPATIENT
Start: 2019-09-18 | End: 2020-04-13 | Stop reason: SDUPTHER

## 2019-09-18 RX ORDER — FUROSEMIDE 20 MG/1
20 TABLET ORAL 2 TIMES DAILY
Qty: 180 TABLET | Refills: 1 | Status: SHIPPED | OUTPATIENT
Start: 2019-09-18 | End: 2020-04-08 | Stop reason: SDUPTHER

## 2019-09-18 RX ORDER — ROSUVASTATIN CALCIUM 40 MG/1
40 TABLET, COATED ORAL NIGHTLY
Qty: 90 TABLET | Refills: 3 | Status: SHIPPED | OUTPATIENT
Start: 2019-09-18 | End: 2020-09-25 | Stop reason: SDUPTHER

## 2019-10-07 DIAGNOSIS — R73.9 HYPERGLYCEMIA: Primary | ICD-10-CM

## 2019-10-07 DIAGNOSIS — I10 ESSENTIAL HYPERTENSION: ICD-10-CM

## 2019-10-08 LAB
BUN SERPL-MCNC: 13 MG/DL (ref 6–20)
BUN/CREAT SERPL: 24.1 (ref 7–25)
CALCIUM SERPL-MCNC: 9.9 MG/DL (ref 8.6–10.5)
CHLORIDE SERPL-SCNC: 96 MMOL/L (ref 98–107)
CO2 SERPL-SCNC: 27.8 MMOL/L (ref 22–29)
CREAT SERPL-MCNC: 0.54 MG/DL (ref 0.57–1)
GLUCOSE SERPL-MCNC: 96 MG/DL (ref 65–99)
Lab: NORMAL
POTASSIUM SERPL-SCNC: 4.3 MMOL/L (ref 3.5–5.2)
SODIUM SERPL-SCNC: 142 MMOL/L (ref 136–145)

## 2019-10-09 ENCOUNTER — TELEPHONE (OUTPATIENT)
Dept: FAMILY MEDICINE CLINIC | Facility: CLINIC | Age: 59
End: 2019-10-09

## 2019-10-09 NOTE — TELEPHONE ENCOUNTER
I called patient and notified her of results.   Patient understood.     ----- Message from JOSEPH Pate sent at 10/9/2019  9:11 AM EDT -----  Please call her and let her know that her labs look good  Follow up as scheduled

## 2020-03-05 DIAGNOSIS — E78.00 HIGH CHOLESTEROL: Primary | ICD-10-CM

## 2020-03-05 DIAGNOSIS — I10 ESSENTIAL HYPERTENSION: ICD-10-CM

## 2020-03-05 DIAGNOSIS — R73.9 HYPERGLYCEMIA: ICD-10-CM

## 2020-03-10 LAB
ALBUMIN SERPL-MCNC: 4.8 G/DL (ref 3.5–5.2)
ALBUMIN/GLOB SERPL: 1.8 G/DL
ALP SERPL-CCNC: 43 U/L (ref 39–117)
ALT SERPL-CCNC: 42 U/L (ref 1–33)
APPEARANCE UR: CLEAR
AST SERPL-CCNC: 41 U/L (ref 1–32)
BACTERIA #/AREA URNS HPF: NORMAL /HPF
BASOPHILS # BLD AUTO: 0.07 10*3/MM3 (ref 0–0.2)
BASOPHILS NFR BLD AUTO: 1.1 % (ref 0–1.5)
BILIRUB SERPL-MCNC: 0.4 MG/DL (ref 0.2–1.2)
BILIRUB UR QL STRIP: NEGATIVE
BUN SERPL-MCNC: 11 MG/DL (ref 6–20)
BUN/CREAT SERPL: 19 (ref 7–25)
CALCIUM SERPL-MCNC: 9.7 MG/DL (ref 8.6–10.5)
CASTS URNS MICRO: NORMAL
CHLORIDE SERPL-SCNC: 97 MMOL/L (ref 98–107)
CHOLEST SERPL-MCNC: 186 MG/DL (ref 0–200)
CO2 SERPL-SCNC: 27.1 MMOL/L (ref 22–29)
COLOR UR: YELLOW
CREAT SERPL-MCNC: 0.58 MG/DL (ref 0.57–1)
EOSINOPHIL # BLD AUTO: 0.21 10*3/MM3 (ref 0–0.4)
EOSINOPHIL NFR BLD AUTO: 3.4 % (ref 0.3–6.2)
EPI CELLS #/AREA URNS HPF: NORMAL /HPF
ERYTHROCYTE [DISTWIDTH] IN BLOOD BY AUTOMATED COUNT: 13 % (ref 12.3–15.4)
GLOBULIN SER CALC-MCNC: 2.7 GM/DL
GLUCOSE SERPL-MCNC: 109 MG/DL (ref 65–99)
GLUCOSE UR QL: NEGATIVE
HCT VFR BLD AUTO: 38.3 % (ref 34–46.6)
HDLC SERPL-MCNC: 73 MG/DL (ref 40–60)
HGB BLD-MCNC: 13.4 G/DL (ref 12–15.9)
HGB UR QL STRIP: NEGATIVE
IMM GRANULOCYTES # BLD AUTO: 0.06 10*3/MM3 (ref 0–0.05)
IMM GRANULOCYTES NFR BLD AUTO: 1 % (ref 0–0.5)
KETONES UR QL STRIP: NEGATIVE
LDLC SERPL CALC-MCNC: 58 MG/DL (ref 0–100)
LDLC/HDLC SERPL: 0.8 {RATIO}
LEUKOCYTE ESTERASE UR QL STRIP: NEGATIVE
LYMPHOCYTES # BLD AUTO: 1.61 10*3/MM3 (ref 0.7–3.1)
LYMPHOCYTES NFR BLD AUTO: 26.3 % (ref 19.6–45.3)
MCH RBC QN AUTO: 32.7 PG (ref 26.6–33)
MCHC RBC AUTO-ENTMCNC: 35 G/DL (ref 31.5–35.7)
MCV RBC AUTO: 93.4 FL (ref 79–97)
MONOCYTES # BLD AUTO: 0.49 10*3/MM3 (ref 0.1–0.9)
MONOCYTES NFR BLD AUTO: 8 % (ref 5–12)
NEUTROPHILS # BLD AUTO: 3.68 10*3/MM3 (ref 1.7–7)
NEUTROPHILS NFR BLD AUTO: 60.2 % (ref 42.7–76)
NITRITE UR QL STRIP: NEGATIVE
NRBC BLD AUTO-RTO: 0 /100 WBC (ref 0–0.2)
PH UR STRIP: 7 [PH] (ref 5–8)
PLATELET # BLD AUTO: 161 10*3/MM3 (ref 140–450)
POTASSIUM SERPL-SCNC: 3.8 MMOL/L (ref 3.5–5.2)
PROT SERPL-MCNC: 7.5 G/DL (ref 6–8.5)
PROT UR QL STRIP: ABNORMAL
RBC # BLD AUTO: 4.1 10*6/MM3 (ref 3.77–5.28)
RBC #/AREA URNS HPF: NORMAL /HPF
SODIUM SERPL-SCNC: 140 MMOL/L (ref 136–145)
SP GR UR: 1.01 (ref 1–1.03)
T4 FREE SERPL-MCNC: 1.03 NG/DL (ref 0.93–1.7)
TRIGL SERPL-MCNC: 273 MG/DL (ref 0–150)
TSH SERPL DL<=0.005 MIU/L-ACNC: 5.06 UIU/ML (ref 0.27–4.2)
UROBILINOGEN UR STRIP-MCNC: ABNORMAL MG/DL
VLDLC SERPL CALC-MCNC: 54.6 MG/DL
WBC # BLD AUTO: 6.12 10*3/MM3 (ref 3.4–10.8)
WBC #/AREA URNS HPF: NORMAL /HPF

## 2020-04-06 RX ORDER — CITALOPRAM 40 MG/1
TABLET ORAL
Qty: 90 TABLET | Refills: 1 | Status: SHIPPED | OUTPATIENT
Start: 2020-04-06 | End: 2020-09-23

## 2020-04-08 RX ORDER — FUROSEMIDE 20 MG/1
20 TABLET ORAL 2 TIMES DAILY
Qty: 180 TABLET | Refills: 0 | Status: SHIPPED | OUTPATIENT
Start: 2020-04-08 | End: 2020-06-30

## 2020-04-08 NOTE — TELEPHONE ENCOUNTER
Patient called and stated that she needs her water pill refilled and sent to [facility] pharmacy phone # [phone]

## 2020-04-13 RX ORDER — AMLODIPINE BESYLATE 10 MG/1
10 TABLET ORAL DAILY
Qty: 90 TABLET | Refills: 1 | Status: SHIPPED | OUTPATIENT
Start: 2020-04-13 | End: 2020-09-25

## 2020-06-30 RX ORDER — FUROSEMIDE 20 MG/1
TABLET ORAL
Qty: 180 TABLET | Refills: 0 | Status: SHIPPED | OUTPATIENT
Start: 2020-06-30 | End: 2021-03-22 | Stop reason: SDUPTHER

## 2020-09-23 RX ORDER — CITALOPRAM 40 MG/1
TABLET ORAL
Qty: 90 TABLET | Refills: 1 | Status: SHIPPED | OUTPATIENT
Start: 2020-09-23 | End: 2021-03-22 | Stop reason: SDUPTHER

## 2020-09-25 RX ORDER — AMLODIPINE BESYLATE 10 MG/1
10 TABLET ORAL DAILY
Qty: 90 TABLET | Refills: 0 | Status: SHIPPED | OUTPATIENT
Start: 2020-09-25 | End: 2021-03-22 | Stop reason: SDUPTHER

## 2020-09-25 RX ORDER — ROSUVASTATIN CALCIUM 40 MG/1
40 TABLET, COATED ORAL NIGHTLY
Qty: 90 TABLET | Refills: 0 | Status: SHIPPED | OUTPATIENT
Start: 2020-09-25 | End: 2021-03-22 | Stop reason: SDUPTHER

## 2021-03-12 RX ORDER — ZINC OXIDE 13 %
CREAM (GRAM) TOPICAL
OUTPATIENT
Start: 2021-03-12

## 2021-03-12 RX ORDER — CITALOPRAM 40 MG/1
40 TABLET ORAL DAILY
Qty: 90 TABLET | Refills: 1 | OUTPATIENT
Start: 2021-03-12

## 2021-03-12 RX ORDER — ROSUVASTATIN CALCIUM 40 MG/1
40 TABLET, COATED ORAL NIGHTLY
Qty: 90 TABLET | Refills: 0 | OUTPATIENT
Start: 2021-03-12

## 2021-03-12 RX ORDER — LOSARTAN POTASSIUM 50 MG/1
50 TABLET ORAL DAILY
Qty: 90 TABLET | Refills: 1 | OUTPATIENT
Start: 2021-03-12

## 2021-03-12 RX ORDER — FUROSEMIDE 20 MG/1
20 TABLET ORAL 2 TIMES DAILY
Qty: 180 TABLET | Refills: 0 | OUTPATIENT
Start: 2021-03-12

## 2021-03-12 RX ORDER — CETIRIZINE HYDROCHLORIDE 10 MG/1
10 TABLET ORAL DAILY
Qty: 30 TABLET | Refills: 3 | OUTPATIENT
Start: 2021-03-12

## 2021-03-12 RX ORDER — AMLODIPINE BESYLATE 10 MG/1
10 TABLET ORAL DAILY
Qty: 90 TABLET | Refills: 0 | OUTPATIENT
Start: 2021-03-12

## 2021-03-12 NOTE — TELEPHONE ENCOUNTER
Caller: Wai Shaunna BARON    Relationship: Self    Best call back number: [phone]    Medication needed:   Requested Prescriptions     Pending Prescriptions Disp Refills   • amLODIPine (NORVASC) 10 MG tablet 90 tablet 0     Sig: Take 1 tablet by mouth Daily. Needs an APPT for further refills   • cetirizine (zyrTEC) 10 MG tablet 30 tablet 3     Sig: Take 1 tablet by mouth Daily.   • citalopram (CeleXA) 40 MG tablet 90 tablet 1     Sig: Take 1 tablet by mouth Daily.   • furosemide (LASIX) 20 MG tablet 180 tablet 0     Sig: Take 1 tablet by mouth 2 (Two) Times a Day.   • losartan (COZAAR) 50 MG tablet 90 tablet 1     Sig: Take 1 tablet by mouth Daily.   • Probiotic Product (Probiotic Daily) capsule       Sig: Take  by mouth.   • rosuvastatin (Crestor) 40 MG tablet 90 tablet 0     Sig: Take 1 tablet by mouth Every Night.       What is the patient's preferred pharmacy: [facility] [place], [place] - [address] [place] [address] [phone] [facility] [phone] FX

## 2021-03-16 DIAGNOSIS — Z00.00 HEALTH CARE MAINTENANCE: Primary | ICD-10-CM

## 2021-03-16 LAB
ALBUMIN SERPL-MCNC: 4.8 G/DL (ref 3.5–5.2)
ALBUMIN/GLOB SERPL: 1.8 G/DL
ALP SERPL-CCNC: 46 U/L (ref 39–117)
ALT SERPL-CCNC: 56 U/L (ref 1–33)
APPEARANCE UR: CLEAR
AST SERPL-CCNC: 64 U/L (ref 1–32)
BACTERIA #/AREA URNS HPF: ABNORMAL /HPF
BASOPHILS # BLD AUTO: 0.06 10*3/MM3 (ref 0–0.2)
BASOPHILS NFR BLD AUTO: 1 % (ref 0–1.5)
BILIRUB SERPL-MCNC: 0.7 MG/DL (ref 0–1.2)
BILIRUB UR QL STRIP: NEGATIVE
BUN SERPL-MCNC: 14 MG/DL (ref 6–20)
BUN/CREAT SERPL: 21.5 (ref 7–25)
CALCIUM SERPL-MCNC: 10.2 MG/DL (ref 8.6–10.5)
CHLORIDE SERPL-SCNC: 97 MMOL/L (ref 98–107)
CHOLEST SERPL-MCNC: 409 MG/DL (ref 0–200)
CO2 SERPL-SCNC: 28.9 MMOL/L (ref 22–29)
COLOR UR: ABNORMAL
CREAT SERPL-MCNC: 0.65 MG/DL (ref 0.57–1)
EOSINOPHIL # BLD AUTO: 0.26 10*3/MM3 (ref 0–0.4)
EOSINOPHIL NFR BLD AUTO: 4.3 % (ref 0.3–6.2)
EPI CELLS #/AREA URNS HPF: ABNORMAL /HPF
ERYTHROCYTE [DISTWIDTH] IN BLOOD BY AUTOMATED COUNT: 12.6 % (ref 12.3–15.4)
GLOBULIN SER CALC-MCNC: 2.6 GM/DL
GLUCOSE SERPL-MCNC: 107 MG/DL (ref 65–99)
GLUCOSE UR QL: NEGATIVE
HCT VFR BLD AUTO: 43.3 % (ref 34–46.6)
HDLC SERPL-MCNC: 61 MG/DL (ref 40–60)
HGB BLD-MCNC: 15.2 G/DL (ref 12–15.9)
HGB UR QL STRIP: NEGATIVE
IMM GRANULOCYTES # BLD AUTO: 0.05 10*3/MM3 (ref 0–0.05)
IMM GRANULOCYTES NFR BLD AUTO: 0.8 % (ref 0–0.5)
KETONES UR QL STRIP: NEGATIVE
LDLC SERPL CALC-MCNC: 238 MG/DL (ref 0–100)
LEUKOCYTE ESTERASE UR QL STRIP: NEGATIVE
LYMPHOCYTES # BLD AUTO: 1.92 10*3/MM3 (ref 0.7–3.1)
LYMPHOCYTES NFR BLD AUTO: 31.9 % (ref 19.6–45.3)
MCH RBC QN AUTO: 34.9 PG (ref 26.6–33)
MCHC RBC AUTO-ENTMCNC: 35.1 G/DL (ref 31.5–35.7)
MCV RBC AUTO: 99.5 FL (ref 79–97)
MONOCYTES # BLD AUTO: 0.54 10*3/MM3 (ref 0.1–0.9)
MONOCYTES NFR BLD AUTO: 9 % (ref 5–12)
NEUTROPHILS # BLD AUTO: 3.19 10*3/MM3 (ref 1.7–7)
NEUTROPHILS NFR BLD AUTO: 53 % (ref 42.7–76)
NITRITE UR QL STRIP: NEGATIVE
NRBC BLD AUTO-RTO: 0 /100 WBC (ref 0–0.2)
PH UR STRIP: 6 [PH] (ref 5–8)
PLATELET # BLD AUTO: 186 10*3/MM3 (ref 140–450)
POTASSIUM SERPL-SCNC: 4 MMOL/L (ref 3.5–5.2)
PROT SERPL-MCNC: 7.4 G/DL (ref 6–8.5)
PROT UR QL STRIP: ABNORMAL
RBC # BLD AUTO: 4.35 10*6/MM3 (ref 3.77–5.28)
RBC #/AREA URNS HPF: ABNORMAL /HPF
SODIUM SERPL-SCNC: 142 MMOL/L (ref 136–145)
SP GR UR: 1.02 (ref 1–1.03)
TRIGL SERPL-MCNC: 487 MG/DL (ref 0–150)
TSH SERPL DL<=0.005 MIU/L-ACNC: 3.57 UIU/ML (ref 0.27–4.2)
UROBILINOGEN UR STRIP-MCNC: ABNORMAL MG/DL
VLDLC SERPL CALC-MCNC: 110 MG/DL (ref 5–40)
WBC # BLD AUTO: 6.02 10*3/MM3 (ref 3.4–10.8)
WBC #/AREA URNS HPF: ABNORMAL /HPF
YEAST #/AREA URNS HPF: ABNORMAL /HPF

## 2021-03-19 NOTE — PROGRESS NOTES
Subjective   Shaunna Carreno is a 60 y.o. female. Patient is here today for   Chief Complaint   Patient presents with   • Hyperlipidemia          Vitals:    03/22/21 0747   BP: 152/90   Pulse: 88   Resp: 16   Temp: 97.1 °F (36.2 °C)   SpO2: 98%     Body mass index is 38.9 kg/m².  The following portions of the patient's history were reviewed and updated as appropriate: allergies, current medications, past family history, past medical history, past social history, past surgical history and problem list.    Past Medical History:   Diagnosis Date   • Hyperlipidemia    • Hypertension       No Known Allergies   Social History     Socioeconomic History   • Marital status:      Spouse name: Not on file   • Number of children: Not on file   • Years of education: Not on file   • Highest education level: Not on file   Tobacco Use   • Smoking status: Never Smoker   • Smokeless tobacco: Never Used   Substance and Sexual Activity   • Alcohol use: Yes     Comment: 1-2 GLASSES OF WINE A NIGHT   • Drug use: Defer   • Sexual activity: Defer        Current Outpatient Medications:   •  amLODIPine (NORVASC) 10 MG tablet, Take 1 tablet by mouth Daily. Needs an APPT for further refills, Disp: 90 tablet, Rfl: 0  •  cetirizine (zyrTEC) 10 MG tablet, TAKE ONE tablet (by mouth) EACH DAY for ALLERGIES, Disp: 30 tablet, Rfl: 3  •  citalopram (CeleXA) 10 MG tablet, Take 4 tablets by mouth Daily., Disp: 90 tablet, Rfl: 0  •  furosemide (LASIX) 20 MG tablet, Take 1 tablet by mouth 2 (Two) Times a Day., Disp: 180 tablet, Rfl: 0  •  losartan (COZAAR) 50 MG tablet, Take 1 tablet by mouth Daily., Disp: 90 tablet, Rfl: 0  •  Probiotic Product (PROBIOTIC DAILY PO), Take  by mouth., Disp: , Rfl:   •  rosuvastatin (Crestor) 40 MG tablet, Take 1 tablet by mouth Every Night., Disp: 90 tablet, Rfl: 0     Objective     History of Present Illness  Shaunna is a 60 year old female patient who is here for a follow up on HLD, HTN, and Elevated fasting  glucose. I have not seen her since sept 2019. She has not come in due to the covid 19 pandemic . She stopped take celexa and atorvastatin . She is taking her blood pressure medication every other day. She is walking some. Her diet is fair   She has had some mild depression and anxiety and stopped celexa over a year ago. She would like to restart this.   I reviewed her labs with her in detail   Orders Only on 03/16/2021   Component Date Value Ref Range Status   • WBC 03/16/2021 6.02  3.40 - 10.80 10*3/mm3 Final   • RBC 03/16/2021 4.35  3.77 - 5.28 10*6/mm3 Final   • Hemoglobin 03/16/2021 15.2  12.0 - 15.9 g/dL Final   • Hematocrit 03/16/2021 43.3  34.0 - 46.6 % Final   • MCV 03/16/2021 99.5* 79.0 - 97.0 fL Final   • MCH 03/16/2021 34.9* 26.6 - 33.0 pg Final   • MCHC 03/16/2021 35.1  31.5 - 35.7 g/dL Final   • RDW 03/16/2021 12.6  12.3 - 15.4 % Final   • Platelets 03/16/2021 186  140 - 450 10*3/mm3 Final   • Neutrophil Rel % 03/16/2021 53.0  42.7 - 76.0 % Final   • Lymphocyte Rel % 03/16/2021 31.9  19.6 - 45.3 % Final   • Monocyte Rel % 03/16/2021 9.0  5.0 - 12.0 % Final   • Eosinophil Rel % 03/16/2021 4.3  0.3 - 6.2 % Final   • Basophil Rel % 03/16/2021 1.0  0.0 - 1.5 % Final   • Neutrophils Absolute 03/16/2021 3.19  1.70 - 7.00 10*3/mm3 Final   • Lymphocytes Absolute 03/16/2021 1.92  0.70 - 3.10 10*3/mm3 Final   • Monocytes Absolute 03/16/2021 0.54  0.10 - 0.90 10*3/mm3 Final   • Eosinophils Absolute 03/16/2021 0.26  0.00 - 0.40 10*3/mm3 Final   • Basophils Absolute 03/16/2021 0.06  0.00 - 0.20 10*3/mm3 Final   • Immature Granulocyte Rel % 03/16/2021 0.8* 0.0 - 0.5 % Final   • Immature Grans Absolute 03/16/2021 0.05  0.00 - 0.05 10*3/mm3 Final   • nRBC 03/16/2021 0.0  0.0 - 0.2 /100 WBC Final   • Glucose 03/16/2021 107* 65 - 99 mg/dL Final   • BUN 03/16/2021 14  6 - 20 mg/dL Final   • Creatinine 03/16/2021 0.65  0.57 - 1.00 mg/dL Final   • eGFR Non  Am 03/16/2021 94  >60 mL/min/1.73 Final    Comment: GFR  Normal >60  Chronic Kidney Disease <60  Kidney Failure <15     • eGFR  Am 03/16/2021 113  >60 mL/min/1.73 Final   • BUN/Creatinine Ratio 03/16/2021 21.5  7.0 - 25.0 Final   • Sodium 03/16/2021 142  136 - 145 mmol/L Final   • Potassium 03/16/2021 4.0  3.5 - 5.2 mmol/L Final   • Chloride 03/16/2021 97* 98 - 107 mmol/L Final   • Total CO2 03/16/2021 28.9  22.0 - 29.0 mmol/L Final   • Calcium 03/16/2021 10.2  8.6 - 10.5 mg/dL Final   • Total Protein 03/16/2021 7.4  6.0 - 8.5 g/dL Final   • Albumin 03/16/2021 4.80  3.50 - 5.20 g/dL Final   • Globulin 03/16/2021 2.6  gm/dL Final   • A/G Ratio 03/16/2021 1.8  g/dL Final   • Total Bilirubin 03/16/2021 0.7  0.0 - 1.2 mg/dL Final   • Alkaline Phosphatase 03/16/2021 46  39 - 117 U/L Final   • AST (SGOT) 03/16/2021 64* 1 - 32 U/L Final   • ALT (SGPT) 03/16/2021 56* 1 - 33 U/L Final   • Total Cholesterol 03/16/2021 409* 0 - 200 mg/dL Final    Comment: Cholesterol Reference Ranges  (U.S. Department of Health and Human Services ATP III  Classifications)  Desirable          <200 mg/dL  Borderline High    200-239 mg/dL  High Risk          >240 mg/dL  Triglyceride Reference Ranges  (U.S. Department of Health and Human Services ATP III  Classifications)  Normal           <150 mg/dL  Borderline High  150-199 mg/dL  High             200-499 mg/dL  Very High        >500 mg/dL  HDL Reference Ranges  (U.S. Department of Health and Human Services ATP III  Classifcations)  Low     <40 mg/dl (major risk factor for CHD)  High    >60 mg/dl ('negative' risk factor for CHD)  LDL Reference Ranges  (U.S. Department of Health and Human Services ATP III  Classifcations)  Optimal          <100 mg/dL  Near Optimal     100-129 mg/dL  Borderline High  130-159 mg/dL  High             160-189 mg/dL  Very High        >189 mg/dL     • Triglycerides 03/16/2021 487* 0 - 150 mg/dL Final   • HDL Cholesterol 03/16/2021 61* 40 - 60 mg/dL Final   • VLDL Cholesterol Eduardo 03/16/2021 110* 5 - 40 mg/dL Final   •  LDL Chol Calc (NIH) 03/16/2021 238* 0 - 100 mg/dL Final   • TSH 03/16/2021 3.570  0.270 - 4.200 uIU/mL Final   • Specific Gravity, UA 03/16/2021 1.018  1.005 - 1.030 Final   • pH, UA 03/16/2021 6.0  5.0 - 8.0 Final   • Color, UA 03/16/2021 See below:*  Final    Comment: Dark Yellow  REFERENCE RANGE: Yellow, Straw     • Appearance, UA 03/16/2021 Clear  Clear Final   • Leukocytes, UA 03/16/2021 Negative  Negative Final   • Protein 03/16/2021 See below:* Negative Final    100 mg/dL (2+)   • Glucose, UA 03/16/2021 Negative  Negative Final   • Ketones 03/16/2021 Negative  Negative Final   • Blood, UA 03/16/2021 Negative  Negative Final   • Bilirubin, UA 03/16/2021 Negative  Negative Final   • Urobilinogen, UA 03/16/2021 Comment   Final    Comment: 0.2 E.U./dL  REFERENCE RANGE: 0.2 - 1.0 E.U./dL     • Nitrite, UA 03/16/2021 Negative  Negative Final   • WBC, UA 03/16/2021 3-5* /HPF Final    REFERENCE RANGE: None Seen, 0-2   • RBC, UA 03/16/2021 Comment  /HPF Final    Comment: None Seen  REFERENCE RANGE: None Seen, 0-2     • Epithelial Cells (non renal) 03/16/2021 3-6* /HPF Final    REFERENCE RANGE: None Seen, 0-2   • Bacteria, UA 03/16/2021 2+* None Seen /HPF Final   • Yeast, UA 03/16/2021 Comment  None Seen /HPF Final    Small/1+ Budding Yeast     PHQ-9 Depression Screening  Little interest or pleasure in doing things? 0   Feeling down, depressed, or hopeless? 2   Trouble falling or staying asleep, or sleeping too much? 0   Feeling tired or having little energy? 0   Poor appetite or overeating? 1   Feeling bad about yourself - or that you are a failure or have let yourself or your family down? 0   Trouble concentrating on things, such as reading the newspaper or watching television? 0   Moving or speaking so slowly that other people could have noticed? Or the opposite - being so fidgety or restless that you have been moving around a lot more than usual? 0   Thoughts that you would be better off dead, or of hurting  yourself in some way? 0   PHQ-9 Total Score 3   If you checked off any problems, how difficult have these problems made it for you to do your work, take care of things at home, or get along with other people?             Review of Systems   Constitutional: Positive for fatigue.   HENT: Negative.    Eyes: Negative for visual disturbance.   Respiratory: Negative.    Cardiovascular: Negative for leg swelling.   Gastrointestinal: Negative.    Genitourinary: Negative.    Musculoskeletal: Positive for arthralgias.   Neurological: Negative for dizziness and headaches.   Psychiatric/Behavioral: Positive for dysphoric mood and sleep disturbance. The patient is nervous/anxious.        Physical Exam  Vitals and nursing note reviewed.   Constitutional:       General: She is not in acute distress.     Appearance: Normal appearance. She is well-developed and well-groomed.   HENT:      Head: Normocephalic.   Eyes:      General: Lids are normal.   Cardiovascular:      Rate and Rhythm: Normal rate and regular rhythm.   Pulmonary:      Effort: Pulmonary effort is normal.      Breath sounds: Normal breath sounds.   Musculoskeletal:      Right lower leg: Edema (trace) present.      Left lower leg: Edema (trace) present.   Skin:     General: Skin is warm and dry.   Neurological:      Mental Status: She is alert.         ASSESSMENT     Problems Addressed this Visit     Hyperglycemia    Hypertension - Primary    Relevant Medications    losartan (COZAAR) 50 MG tablet    amLODIPine (NORVASC) 10 MG tablet    furosemide (LASIX) 20 MG tablet    Anxiety      Other Visit Diagnoses     Screening mammogram, encounter for        Relevant Orders    Mammo Screening Bilateral With CAD    Current mild episode of major depressive disorder without prior episode (CMS/[facility])        Relevant Medications    citalopram (CeleXA) 10 MG tablet    Mixed hyperlipidemia        Relevant Medications    rosuvastatin (Crestor) 40 MG tablet      Diagnoses       Codes  Comments    Essential hypertension    -  Primary ICD-10-CM: I10  ICD-9-CM: 401.9     Hyperglycemia     ICD-10-CM: R73.9  ICD-9-CM: 790.29     Screening mammogram, encounter for     ICD-10-CM: Z12.31  ICD-9-CM: V76.12     Anxiety     ICD-10-CM: F41.9  ICD-9-CM: 300.00     Current mild episode of major depressive disorder without prior episode (CMS/[facility])     ICD-10-CM: F32.0  ICD-9-CM: 296.21     Mixed hyperlipidemia     ICD-10-CM: E78.2  ICD-9-CM: 272.2           PLAN  1. HLD- poorly controlled, has not taken her medication in months and has been noncompliant with treatment due to the covid 19 pandemic- restart crestor, take 20mg daily and increase to 40mg as tolerated   2. HTN- poorly controlled, has been taking her medication every other day  3. Depression /anxiety- will restart citalopram, start at 10mg daily for a week or so and increase to 20mg as tolerated  4. Discussed dietary changes, weight loss and exercise  5. HM- mammogram ordered, discussed scheduling covid vaccine , patient wants to wait   Return in about 3 months (around 6/22/2021) for Annual physical, with labs. lipid panel, cmp, UA only

## 2021-03-22 ENCOUNTER — OFFICE VISIT (OUTPATIENT)
Dept: INTERNAL MEDICINE | Facility: CLINIC | Age: 61
End: 2021-03-22

## 2021-03-22 VITALS
WEIGHT: 241 LBS | DIASTOLIC BLOOD PRESSURE: 90 MMHG | BODY MASS INDEX: 38.73 KG/M2 | TEMPERATURE: 97.1 F | SYSTOLIC BLOOD PRESSURE: 152 MMHG | HEART RATE: 88 BPM | RESPIRATION RATE: 16 BRPM | HEIGHT: 66 IN | OXYGEN SATURATION: 98 %

## 2021-03-22 DIAGNOSIS — Z12.31 SCREENING MAMMOGRAM, ENCOUNTER FOR: ICD-10-CM

## 2021-03-22 DIAGNOSIS — I10 ESSENTIAL HYPERTENSION: Primary | ICD-10-CM

## 2021-03-22 DIAGNOSIS — R73.9 HYPERGLYCEMIA: ICD-10-CM

## 2021-03-22 DIAGNOSIS — F32.0 CURRENT MILD EPISODE OF MAJOR DEPRESSIVE DISORDER WITHOUT PRIOR EPISODE (HCC): ICD-10-CM

## 2021-03-22 DIAGNOSIS — E78.2 MIXED HYPERLIPIDEMIA: ICD-10-CM

## 2021-03-22 DIAGNOSIS — F41.9 ANXIETY: ICD-10-CM

## 2021-03-22 PROCEDURE — 99214 OFFICE O/P EST MOD 30 MIN: CPT | Performed by: NURSE PRACTITIONER

## 2021-03-22 RX ORDER — AMLODIPINE BESYLATE 10 MG/1
10 TABLET ORAL DAILY
Qty: 90 TABLET | Refills: 0 | Status: SHIPPED | OUTPATIENT
Start: 2021-03-22 | End: 2021-07-01

## 2021-03-22 RX ORDER — ROSUVASTATIN CALCIUM 40 MG/1
40 TABLET, COATED ORAL NIGHTLY
Qty: 90 TABLET | Refills: 0 | Status: SHIPPED | OUTPATIENT
Start: 2021-03-22 | End: 2021-07-01

## 2021-03-22 RX ORDER — CITALOPRAM 10 MG/1
40 TABLET ORAL DAILY
Qty: 90 TABLET | Refills: 0 | Status: SHIPPED | OUTPATIENT
Start: 2021-03-22 | End: 2021-04-23

## 2021-03-22 RX ORDER — FUROSEMIDE 20 MG/1
20 TABLET ORAL 2 TIMES DAILY
Qty: 180 TABLET | Refills: 0 | Status: SHIPPED | OUTPATIENT
Start: 2021-03-22 | End: 2021-07-01

## 2021-03-22 RX ORDER — LOSARTAN POTASSIUM 50 MG/1
50 TABLET ORAL DAILY
Qty: 90 TABLET | Refills: 0 | Status: SHIPPED | OUTPATIENT
Start: 2021-03-22 | End: 2021-07-01

## 2021-04-23 RX ORDER — CITALOPRAM 10 MG/1
40 TABLET ORAL DAILY
Qty: 90 TABLET | Refills: 3 | Status: SHIPPED | OUTPATIENT
Start: 2021-04-23 | End: 2021-09-08

## 2021-06-17 DIAGNOSIS — Z00.00 HEALTH CARE MAINTENANCE: Primary | ICD-10-CM

## 2021-07-01 RX ORDER — ROSUVASTATIN CALCIUM 40 MG/1
40 TABLET, COATED ORAL NIGHTLY
Qty: 90 TABLET | Refills: 0 | Status: SHIPPED | OUTPATIENT
Start: 2021-07-01 | End: 2021-10-19

## 2021-07-01 RX ORDER — LOSARTAN POTASSIUM 50 MG/1
50 TABLET ORAL DAILY
Qty: 90 TABLET | Refills: 0 | Status: SHIPPED | OUTPATIENT
Start: 2021-07-01 | End: 2021-10-18

## 2021-07-01 RX ORDER — AMLODIPINE BESYLATE 10 MG/1
10 TABLET ORAL DAILY
Qty: 90 TABLET | Refills: 0 | Status: SHIPPED | OUTPATIENT
Start: 2021-07-01 | End: 2021-10-19

## 2021-07-01 RX ORDER — FUROSEMIDE 20 MG/1
TABLET ORAL
Qty: 180 TABLET | Refills: 0 | Status: SHIPPED | OUTPATIENT
Start: 2021-07-01 | End: 2021-10-18

## 2021-07-08 LAB
ALBUMIN SERPL-MCNC: 5.1 G/DL (ref 3.5–5.2)
ALBUMIN/GLOB SERPL: 2 G/DL
ALP SERPL-CCNC: 48 U/L (ref 39–117)
ALT SERPL-CCNC: 55 U/L (ref 1–33)
APPEARANCE UR: CLEAR
AST SERPL-CCNC: 62 U/L (ref 1–32)
BACTERIA #/AREA URNS HPF: ABNORMAL /HPF
BILIRUB SERPL-MCNC: 0.4 MG/DL (ref 0–1.2)
BILIRUB UR QL STRIP: NEGATIVE
BUN SERPL-MCNC: 9 MG/DL (ref 6–20)
BUN/CREAT SERPL: 14.5 (ref 7–25)
CALCIUM SERPL-MCNC: 10 MG/DL (ref 8.6–10.5)
CASTS URNS MICRO: ABNORMAL
CHLORIDE SERPL-SCNC: 97 MMOL/L (ref 98–107)
CHOLEST SERPL-MCNC: 262 MG/DL (ref 0–200)
CO2 SERPL-SCNC: 25.4 MMOL/L (ref 22–29)
COLOR UR: ABNORMAL
CREAT SERPL-MCNC: 0.62 MG/DL (ref 0.57–1)
EPI CELLS #/AREA URNS HPF: ABNORMAL /HPF
GLOBULIN SER CALC-MCNC: 2.5 GM/DL
GLUCOSE SERPL-MCNC: 93 MG/DL (ref 65–99)
GLUCOSE UR QL: NEGATIVE
HDLC SERPL-MCNC: 67 MG/DL (ref 40–60)
HGB UR QL STRIP: NEGATIVE
KETONES UR QL STRIP: NEGATIVE
LDLC SERPL CALC-MCNC: 120 MG/DL (ref 0–100)
LDLC/HDLC SERPL: 1.64 {RATIO}
LEUKOCYTE ESTERASE UR QL STRIP: NEGATIVE
NITRITE UR QL STRIP: NEGATIVE
PH UR STRIP: 6 [PH] (ref 5–8)
POTASSIUM SERPL-SCNC: 3.8 MMOL/L (ref 3.5–5.2)
PROT SERPL-MCNC: 7.6 G/DL (ref 6–8.5)
PROT UR QL STRIP: ABNORMAL
RBC #/AREA URNS HPF: ABNORMAL /HPF
SODIUM SERPL-SCNC: 137 MMOL/L (ref 136–145)
SP GR UR: 1.02 (ref 1–1.03)
TRIGL SERPL-MCNC: 427 MG/DL (ref 0–150)
UROBILINOGEN UR STRIP-MCNC: ABNORMAL MG/DL
VLDLC SERPL CALC-MCNC: 75 MG/DL (ref 5–40)
WBC #/AREA URNS HPF: ABNORMAL /HPF

## 2021-07-12 ENCOUNTER — OFFICE VISIT (OUTPATIENT)
Dept: INTERNAL MEDICINE | Facility: CLINIC | Age: 61
End: 2021-07-12

## 2021-07-12 VITALS
HEIGHT: 66 IN | RESPIRATION RATE: 18 BRPM | OXYGEN SATURATION: 97 % | WEIGHT: 240 LBS | TEMPERATURE: 98.2 F | SYSTOLIC BLOOD PRESSURE: 118 MMHG | DIASTOLIC BLOOD PRESSURE: 70 MMHG | BODY MASS INDEX: 38.57 KG/M2 | HEART RATE: 81 BPM

## 2021-07-12 DIAGNOSIS — Z00.00 ANNUAL PHYSICAL EXAM: Primary | ICD-10-CM

## 2021-07-12 PROCEDURE — 99396 PREV VISIT EST AGE 40-64: CPT | Performed by: NURSE PRACTITIONER

## 2021-07-12 PROCEDURE — 93000 ELECTROCARDIOGRAM COMPLETE: CPT | Performed by: NURSE PRACTITIONER

## 2021-07-12 RX ORDER — CETIRIZINE HYDROCHLORIDE 10 MG/1
10 TABLET ORAL DAILY
Qty: 90 TABLET | Refills: 3 | Status: SHIPPED | OUTPATIENT
Start: 2021-07-12

## 2021-07-12 NOTE — PROGRESS NOTES
Subjective   Shaunna Carreno is a 60 y.o. female. Patient is here today for   Chief Complaint   Patient presents with   • Annual Exam          Vitals:    07/12/21 0943   BP: 118/70   Pulse: 81   Resp: 18   Temp: 98.2 °F (36.8 °C)   SpO2: 97%     Body mass index is 38.74 kg/m².    The following portions of the patient's history were reviewed and updated as appropriate: allergies, current medications, past family history, past medical history, past social history, past surgical history and problem list.    Past Medical History:   Diagnosis Date   • Hyperlipidemia    • Hypertension       No Known Allergies   Social History     Socioeconomic History   • Marital status:      Spouse name: Not on file   • Number of children: Not on file   • Years of education: Not on file   • Highest education level: Not on file   Tobacco Use   • Smoking status: Never Smoker   • Smokeless tobacco: Never Used   Substance and Sexual Activity   • Alcohol use: Yes     Comment: 1-2 GLASSES OF WINE A NIGHT   • Drug use: Defer   • Sexual activity: Defer        Current Outpatient Medications:   •  amLODIPine (NORVASC) 10 MG tablet, Take 1 tablet by mouth Daily. Needs an APPT for further refills, Disp: 90 tablet, Rfl: 0  •  citalopram (CeleXA) 10 MG tablet, Take 4 tablets by mouth Daily., Disp: 90 tablet, Rfl: 3  •  furosemide (LASIX) 20 MG tablet, TAKE ONE TABLET BY MOUTH TWICE DAILY , Disp: 180 tablet, Rfl: 0  •  losartan (COZAAR) 50 MG tablet, Take 1 tablet by mouth Daily., Disp: 90 tablet, Rfl: 0  •  Probiotic Product (PROBIOTIC DAILY PO), Take  by mouth., Disp: , Rfl:   •  rosuvastatin (CRESTOR) 40 MG tablet, Take 1 tablet by mouth Every Night., Disp: 90 tablet, Rfl: 0  •  cetirizine (zyrTEC) 10 MG tablet, Take 1 tablet by mouth Daily., Disp: 90 tablet, Rfl: 3     EKG  ECG Report   ECG 12 Lead    Date/Time: 7/12/2021 11:12 AM  Performed by: Cat Wise APRN  Authorized by: Cat Wise APRN   Comparison: compared with  previous ECG from 9/19/2028  Similar to previous ECG  Rhythm: sinus rhythm  Rate: normal  Comments: No change               Objective   History of Present Illness   Shaunna Carreno 60 y.o. female who presents for an Annual Wellness Visit.  she has a history of   Patient Active Problem List   Diagnosis   • Colitis, nonspecific   • Diverticulosis   • High cholesterol   • Hyperglycemia   • Hypertension   • Obesity (BMI 30-39.9)   • Pulmonary hypertension (CMS/HCC)   • Anxiety   .  she has been doing well with no interval problems.  Labs results discussed in detail with the patient.  Plan to update vaccines if needed today.    Health Habits:  Dental Exam. not up to date - .  Eye Exam. up to date  Exercise: 0 times/week.  Current exercise activities include: none  Diet - alcohol one/night  Diet is fair,     Lab Results (most recent)     Orders Only on 06/17/2021   Component Date Value Ref Range Status   • Glucose 07/08/2021 93  65 - 99 mg/dL Final   • BUN 07/08/2021 9  6 - 20 mg/dL Final   • Creatinine 07/08/2021 0.62  0.57 - 1.00 mg/dL Final   • eGFR Non  Am 07/08/2021 99  >60 mL/min/1.73 Final    Comment: GFR Normal >60  Chronic Kidney Disease <60  Kidney Failure <15     • eGFR  Am 07/08/2021 120  >60 mL/min/1.73 Final   • BUN/Creatinine Ratio 07/08/2021 14.5  7.0 - 25.0 Final   • Sodium 07/08/2021 137  136 - 145 mmol/L Final   • Potassium 07/08/2021 3.8  3.5 - 5.2 mmol/L Final   • Chloride 07/08/2021 97* 98 - 107 mmol/L Final   • Total CO2 07/08/2021 25.4  22.0 - 29.0 mmol/L Final   • Calcium 07/08/2021 10.0  8.6 - 10.5 mg/dL Final   • Total Protein 07/08/2021 7.6  6.0 - 8.5 g/dL Final   • Albumin 07/08/2021 5.10  3.50 - 5.20 g/dL Final   • Globulin 07/08/2021 2.5  gm/dL Final   • A/G Ratio 07/08/2021 2.0  g/dL Final   • Total Bilirubin 07/08/2021 0.4  0.0 - 1.2 mg/dL Final   • Alkaline Phosphatase 07/08/2021 48  39 - 117 U/L Final   • AST (SGOT) 07/08/2021 62* 1 - 32 U/L Final   • ALT (SGPT) 07/08/2021  55* 1 - 33 U/L Final   • Total Cholesterol 07/08/2021 262* 0 - 200 mg/dL Final    Comment: Cholesterol Reference Ranges  (U.S. Department of Health and Human Services ATP III  Classifications)  Desirable          <200 mg/dL  Borderline High    200-239 mg/dL  High Risk          >240 mg/dL  Triglyceride Reference Ranges  (U.S. Department of Health and Human Services ATP III  Classifications)  Normal           <150 mg/dL  Borderline High  150-199 mg/dL  High             200-499 mg/dL  Very High        >500 mg/dL  HDL Reference Ranges  (U.S. Department of Health and Human Services ATP III  Classifcations)  Low     <40 mg/dl (major risk factor for CHD)  High    >60 mg/dl ('negative' risk factor for CHD)  LDL Reference Ranges  (U.S. Department of Health and Human Services ATP III  Classifcations)  Optimal          <100 mg/dL  Near Optimal     100-129 mg/dL  Borderline High  130-159 mg/dL  High             160-189 mg/dL  Very High        >189 mg/dL     • Triglycerides 07/08/2021 427* 0 - 150 mg/dL Final   • HDL Cholesterol 07/08/2021 67* 40 - 60 mg/dL Final   • VLDL Cholesterol Eduardo 07/08/2021 75* 5 - 40 mg/dL Final   • LDL Chol Calc ([facility]) 07/08/2021 120* 0 - 100 mg/dL Final   • LDL/HDL RATIO 07/08/2021 1.64   Final   • Specific Gravity, UA 07/08/2021 1.017  1.005 - 1.030 Final   • pH, UA 07/08/2021 6.0  5.0 - 8.0 Final   • Color, UA 07/08/2021 See below:*  Final    Comment: Dark Yellow  REFERENCE RANGE: Yellow, Straw     • Appearance, UA 07/08/2021 Clear  Clear Final   • Leukocytes, UA 07/08/2021 Negative  Negative Final   • Protein 07/08/2021 See below:* Negative Final    >=300 mg/dL (3+)   • Glucose, UA 07/08/2021 Negative  Negative Final   • Ketones 07/08/2021 Negative  Negative Final   • Blood, UA 07/08/2021 Negative  Negative Final   • Bilirubin, UA 07/08/2021 Negative  Negative Final   • Urobilinogen, UA 07/08/2021 Comment   Final    Comment: 0.2 E.U./dL  REFERENCE RANGE: 0.2 - 1.0 E.U./dL     • Nitrite, UA  07/08/2021 Negative  Negative Final   • WBC, UA 07/08/2021 3-5* /HPF Final    REFERENCE RANGE: None Seen, 0-2   • RBC, UA 07/08/2021 0-2  /HPF Final    REFERENCE RANGE: None Seen, 0-2   • Epithelial Cells (non renal) 07/08/2021 3-6* /HPF Final    REFERENCE RANGE: None Seen, 0-2   • Cast Type 07/08/2021 Comment   Final    HYALINE CASTS, UA            3-6              /LPF       None Seen  N   • Bacteria, UA 07/08/2021 4+* None Seen /HPF Final                 Review of Systems   HENT: Negative.    Respiratory: Negative.    Cardiovascular: Positive for leg swelling. Negative for chest pain and palpitations.   Gastrointestinal: Negative.    Genitourinary: Negative.    Musculoskeletal: Negative.    Skin: Negative.    Neurological: Negative.    Psychiatric/Behavioral: Negative for sleep disturbance. The patient is nervous/anxious (mild ).        Physical Exam  Vitals and nursing note reviewed.   Constitutional:       Appearance: Normal appearance. She is well-developed and well-groomed.   HENT:      Head: Normocephalic.      Right Ear: There is impacted cerumen.      Left Ear: There is impacted cerumen.   Eyes:      General: Lids are normal.   Neck:      Thyroid: No thyromegaly.   Cardiovascular:      Rate and Rhythm: Normal rate and regular rhythm.      Heart sounds: Normal heart sounds.      Comments: Trace lower extremity edema   Pulmonary:      Effort: Pulmonary effort is normal.      Breath sounds: Normal breath sounds.   Abdominal:      General: Bowel sounds are increased.      Palpations: Abdomen is soft.   Musculoskeletal:      Cervical back: Neck supple.   Skin:     General: Skin is warm and dry.   Neurological:      Mental Status: She is alert.         ASSESSMENT       Problems Addressed this Visit     None      Visit Diagnoses     Annual physical exam    -  Primary    Relevant Orders    ECG 12 Lead      Diagnoses       Codes Comments    Annual physical exam    -  Primary ICD-10-CM: Z00.00  ICD-9-CM: V70.0            PLAN    BP is well controlled   TC, LDL and trigs are elevated but have improved, continue crestor 40mg daily, and discussed dietary changes, if no improvement with dietary changes will add or change medication  BMI 38.8 Discussed diet, exercise and weight loss   AST and ALT are elevated at 62, 55, decrease ETOH, low fat diet   Declined covid vaccine  Mammogram ordered in march- pt just needs to schedule  Recommend vascular screenings, and scheduling information given   Schedule dental exam  Does not want covid 19 vaccine at this time   Return in about 6 months (around 1/12/2022) for Recheck, with labs. cmp and lipid panel or sooner if needed

## 2021-09-08 RX ORDER — CITALOPRAM 10 MG/1
40 TABLET ORAL DAILY
Qty: 90 TABLET | Refills: 0 | Status: SHIPPED | OUTPATIENT
Start: 2021-09-08 | End: 2021-10-19

## 2021-10-18 RX ORDER — LOSARTAN POTASSIUM 50 MG/1
50 TABLET ORAL DAILY
Qty: 90 TABLET | Refills: 0 | Status: SHIPPED | OUTPATIENT
Start: 2021-10-18 | End: 2022-01-04

## 2021-10-18 RX ORDER — FUROSEMIDE 20 MG/1
TABLET ORAL
Qty: 180 TABLET | Refills: 0 | Status: SHIPPED | OUTPATIENT
Start: 2021-10-18 | End: 2022-01-04

## 2021-10-19 RX ORDER — AMLODIPINE BESYLATE 10 MG/1
10 TABLET ORAL DAILY
Qty: 90 TABLET | Refills: 0 | Status: SHIPPED | OUTPATIENT
Start: 2021-10-19 | End: 2022-01-04

## 2021-10-19 RX ORDER — ROSUVASTATIN CALCIUM 40 MG/1
40 TABLET, COATED ORAL NIGHTLY
Qty: 90 TABLET | Refills: 0 | Status: SHIPPED | OUTPATIENT
Start: 2021-10-19 | End: 2022-01-04

## 2021-10-19 RX ORDER — CITALOPRAM 10 MG/1
40 TABLET ORAL DAILY
Qty: 90 TABLET | Refills: 0 | Status: SHIPPED | OUTPATIENT
Start: 2021-10-19 | End: 2021-11-15

## 2021-11-15 RX ORDER — CITALOPRAM 10 MG/1
40 TABLET ORAL DAILY
Qty: 90 TABLET | Refills: 0 | Status: SHIPPED | OUTPATIENT
Start: 2021-11-15 | End: 2021-12-09

## 2021-12-09 RX ORDER — CITALOPRAM 10 MG/1
40 TABLET ORAL DAILY
Qty: 90 TABLET | Refills: 0 | Status: SHIPPED | OUTPATIENT
Start: 2021-12-09 | End: 2022-01-04

## 2022-01-04 RX ORDER — ROSUVASTATIN CALCIUM 40 MG/1
40 TABLET, COATED ORAL NIGHTLY
Qty: 30 TABLET | Refills: 0 | Status: SHIPPED | OUTPATIENT
Start: 2022-01-04 | End: 2022-03-04 | Stop reason: SDUPTHER

## 2022-01-04 RX ORDER — CITALOPRAM 10 MG/1
40 TABLET ORAL DAILY
Qty: 90 TABLET | Refills: 0 | Status: SHIPPED | OUTPATIENT
Start: 2022-01-04 | End: 2022-03-04 | Stop reason: SDUPTHER

## 2022-01-04 RX ORDER — AMLODIPINE BESYLATE 10 MG/1
10 TABLET ORAL DAILY
Qty: 30 TABLET | Refills: 0 | Status: SHIPPED | OUTPATIENT
Start: 2022-01-04 | End: 2022-03-04 | Stop reason: SDUPTHER

## 2022-01-04 RX ORDER — LOSARTAN POTASSIUM 50 MG/1
50 TABLET ORAL DAILY
Qty: 30 TABLET | Refills: 0 | Status: SHIPPED | OUTPATIENT
Start: 2022-01-04 | End: 2022-03-04 | Stop reason: SDUPTHER

## 2022-01-04 RX ORDER — FUROSEMIDE 20 MG/1
TABLET ORAL
Qty: 60 TABLET | Refills: 0 | Status: SHIPPED | OUTPATIENT
Start: 2022-01-04 | End: 2022-09-21 | Stop reason: SDUPTHER

## 2022-01-14 DIAGNOSIS — E78.2 MIXED HYPERLIPIDEMIA: Primary | ICD-10-CM

## 2022-03-04 ENCOUNTER — TELEPHONE (OUTPATIENT)
Dept: INTERNAL MEDICINE | Facility: CLINIC | Age: 62
End: 2022-03-04

## 2022-03-04 RX ORDER — ROSUVASTATIN CALCIUM 40 MG/1
40 TABLET, COATED ORAL NIGHTLY
Qty: 7 TABLET | Refills: 0 | Status: SHIPPED | OUTPATIENT
Start: 2022-03-04 | End: 2022-03-10 | Stop reason: SDUPTHER

## 2022-03-04 RX ORDER — CITALOPRAM 10 MG/1
40 TABLET ORAL DAILY
Qty: 28 TABLET | Refills: 0 | Status: SHIPPED | OUTPATIENT
Start: 2022-03-04 | End: 2022-03-10 | Stop reason: SDUPTHER

## 2022-03-04 RX ORDER — LOSARTAN POTASSIUM 50 MG/1
50 TABLET ORAL DAILY
Qty: 7 TABLET | Refills: 0 | Status: SHIPPED | OUTPATIENT
Start: 2022-03-04 | End: 2022-03-10 | Stop reason: SDUPTHER

## 2022-03-04 RX ORDER — AMLODIPINE BESYLATE 10 MG/1
10 TABLET ORAL DAILY
Qty: 7 TABLET | Refills: 0 | Status: SHIPPED | OUTPATIENT
Start: 2022-03-04 | End: 2022-03-10 | Stop reason: SDUPTHER

## 2022-03-05 LAB
ALBUMIN SERPL-MCNC: 5.1 G/DL (ref 3.8–4.9)
ALBUMIN/GLOB SERPL: 1.9 {RATIO} (ref 1.2–2.2)
ALP SERPL-CCNC: 45 IU/L (ref 44–121)
ALT SERPL-CCNC: 32 IU/L (ref 0–32)
AST SERPL-CCNC: 43 IU/L (ref 0–40)
BILIRUB SERPL-MCNC: 0.5 MG/DL (ref 0–1.2)
BUN SERPL-MCNC: 11 MG/DL (ref 6–24)
BUN/CREAT SERPL: 17 (ref 9–23)
CALCIUM SERPL-MCNC: 10.4 MG/DL (ref 8.7–10.2)
CHLORIDE SERPL-SCNC: 97 MMOL/L (ref 96–106)
CHOLEST SERPL-MCNC: 269 MG/DL (ref 100–199)
CO2 SERPL-SCNC: 22 MMOL/L (ref 20–29)
CREAT SERPL-MCNC: 0.65 MG/DL (ref 0.57–1)
EGFR GENE MUT ANL BLD/T: 101 ML/MIN/1.73
GLOBULIN SER CALC-MCNC: 2.7 G/DL (ref 1.5–4.5)
GLUCOSE SERPL-MCNC: 118 MG/DL (ref 65–99)
HDLC SERPL-MCNC: 78 MG/DL
LDLC SERPL CALC-MCNC: 113 MG/DL (ref 0–99)
LDLC/HDLC SERPL: 1.4 RATIO (ref 0–3.2)
POTASSIUM SERPL-SCNC: 3.8 MMOL/L (ref 3.5–5.2)
PROT SERPL-MCNC: 7.8 G/DL (ref 6–8.5)
SODIUM SERPL-SCNC: 139 MMOL/L (ref 134–144)
TRIGL SERPL-MCNC: 455 MG/DL (ref 0–149)
VLDLC SERPL CALC-MCNC: 78 MG/DL (ref 5–40)

## 2022-03-09 NOTE — PROGRESS NOTES
Subjective   Shaunna Carreno is a 61 y.o. female. Patient is here today for   Chief Complaint   Patient presents with   • Hyperlipidemia          Vitals:    03/10/22 0921   BP: 150/74   Pulse: 80   Resp: 18   Temp: 98 °F (36.7 °C)   SpO2: 98%     Body mass index is 38.41 kg/m².  The following portions of the patient's history were reviewed and updated as appropriate: allergies, current medications, past family history, past medical history, past social history, past surgical history and problem list.    Past Medical History:   Diagnosis Date   • Hyperlipidemia    • Hypertension       No Known Allergies   Social History     Socioeconomic History   • Marital status:    Tobacco Use   • Smoking status: Never Smoker   • Smokeless tobacco: Never Used   Substance and Sexual Activity   • Alcohol use: Yes     Comment: 1-2 GLASSES OF WINE A NIGHT   • Drug use: Defer   • Sexual activity: Defer        Current Outpatient Medications:   •  amLODIPine (NORVASC) 10 MG tablet, Take 1 tablet by mouth Daily. Needs an APPT for further refills, Disp: 90 tablet, Rfl: 1  •  cetirizine (zyrTEC) 10 MG tablet, Take 1 tablet by mouth Daily., Disp: 90 tablet, Rfl: 3  •  citalopram (CeleXA) 40 MG tablet, Take 1 tablet by mouth Daily., Disp: 90 tablet, Rfl: 3  •  furosemide (LASIX) 20 MG tablet, TAKE ONE TABLET BY MOUTH TWICE DAILY, Disp: 60 tablet, Rfl: 0  •  losartan (COZAAR) 50 MG tablet, Take 1 tablet by mouth Daily., Disp: 90 tablet, Rfl: 1  •  Probiotic Product (PROBIOTIC DAILY PO), Take  by mouth., Disp: , Rfl:   •  rosuvastatin (CRESTOR) 40 MG tablet, Take 1 tablet by mouth Every Night., Disp: 90 tablet, Rfl: 1  •  hydrOXYzine (ATARAX) 25 MG tablet, Take 1 tablet by mouth 3 (Three) Times a Day As Needed for Anxiety., Disp: 30 tablet, Rfl: 3     Objective     History of Present Illness  Shaunna is a 61 year old female patient who is here for a follow up for HTN, HLD, anxiety and impaired fasting glucose. She has had more anxiety  recently. She is currently on citalopram 40 mg daily. She has increased worry. She has been out of her medication due to not having insurance and recently got a new insurance plan. She overall feels well . She denies CP and shortness of breath.   I reviewed labs with her in detail  No visits with results within 1 Month(s) from this visit.   Latest known visit with results is:   Orders Only on 01/14/2022   Component Date Value Ref Range Status   • Glucose 03/04/2022 118 (A) 65 - 99 mg/dL Final   • BUN 03/04/2022 11  6 - 24 mg/dL Final   • Creatinine 03/04/2022 0.65  0.57 - 1.00 mg/dL Final   • EGFR Result 03/04/2022 101  >59 mL/min/1.73 Final    Comment: **In accordance with recommendations from the NKF-ASN Task force,**    [facility] has updated its eGFR calculation to the 2021 CKD-EPI    creatinine equation that estimates kidney function without a race    variable.     • BUN/Creatinine Ratio 03/04/2022 17  9 - 23 Final   • Sodium 03/04/2022 139  134 - 144 mmol/L Final   • Potassium 03/04/2022 3.8  3.5 - 5.2 mmol/L Final   • Chloride 03/04/2022 97  96 - 106 mmol/L Final   • Total CO2 03/04/2022 22  20 - 29 mmol/L Final   • Calcium 03/04/2022 10.4 (A) 8.7 - 10.2 mg/dL Final   • Total Protein 03/04/2022 7.8  6.0 - 8.5 g/dL Final   • Albumin 03/04/2022 5.1 (A) 3.8 - 4.9 g/dL Final   • Globulin 03/04/2022 2.7  1.5 - 4.5 g/dL Final   • A/G Ratio 03/04/2022 1.9  1.2 - 2.2 Final   • Total Bilirubin 03/04/2022 0.5  0.0 - 1.2 mg/dL Final   • Alkaline Phosphatase 03/04/2022 45  44 - 121 IU/L Final   • AST (SGOT) 03/04/2022 43 (A) 0 - 40 IU/L Final   • ALT (SGPT) 03/04/2022 32  0 - 32 IU/L Final   • Total Cholesterol 03/04/2022 269 (A) 100 - 199 mg/dL Final   • Triglycerides 03/04/2022 455 (A) 0 - 149 mg/dL Final   • HDL Cholesterol 03/04/2022 78  >39 mg/dL Final   • VLDL Cholesterol Eduardo 03/04/2022 78 (A) 5 - 40 mg/dL Final   • LDL Chol Calc ([facility]) 03/04/2022 113 (A) 0 - 99 mg/dL Final   • LDL/HDL RATIO 03/04/2022 1.4  0.0 - 3.2  ratio Final    Comment:                                     LDL/HDL Ratio                                              Men  Women                                1/2 Avg.Risk  1.0    1.5                                    Avg.Risk  3.6    3.2                                 2X Avg.Risk  6.2    5.0                                 3X Avg.Risk  8.0    6.1           Review of Systems   Constitutional: Negative for fatigue.   HENT: Negative.    Eyes: Negative for visual disturbance.   Respiratory: Negative for shortness of breath and wheezing.    Cardiovascular: Positive for leg swelling. Negative for chest pain and palpitations.   Neurological: Negative.        Physical Exam  Vitals and nursing note reviewed.   Constitutional:       General: She is not in acute distress.     Appearance: Normal appearance. She is well-developed and well-groomed.   HENT:      Head: Normocephalic.   Cardiovascular:      Rate and Rhythm: Normal rate and regular rhythm.      Heart sounds: Normal heart sounds.   Pulmonary:      Effort: Pulmonary effort is normal.      Breath sounds: Normal breath sounds.   Skin:     General: Skin is warm and dry.   Neurological:      Mental Status: She is alert and oriented to person, place, and time.   Psychiatric:         Mood and Affect: Mood normal.         ASSESSMENT     Problems Addressed this Visit     High cholesterol    Relevant Medications    rosuvastatin (CRESTOR) 40 MG tablet    Hyperglycemia - Primary    Hypertension    Relevant Medications    losartan (COZAAR) 50 MG tablet    amLODIPine (NORVASC) 10 MG tablet    Anxiety      Diagnoses       Codes Comments    Hyperglycemia    -  Primary ICD-10-CM: R73.9  ICD-9-CM: 790.29     High cholesterol     ICD-10-CM: E78.00  ICD-9-CM: 272.0     Primary hypertension     ICD-10-CM: I10  ICD-9-CM: 401.9     Anxiety     ICD-10-CM: F41.9  ICD-9-CM: 300.00           PLAN  1. HTN- BP is elevated today, pt has been out of medication for a few months due to not  having insurance, will continue amlodipine, losartan and furosemide, continue to monitor BP at home and pt will notify me if readings are elevated  2. HLD- continue statin- pt has been out of medication  3. Anxiety- continue citalopram, will add hydroxyzine as needed - advised not to take while driving as can cause drowsiness  4. Glucose is 118, will check a1c at next visit   5. Mammogram is scheduled   Return in about 6 months (around 9/10/2022) for Annual physical, with labs. will check a1c at that time as well

## 2022-03-10 ENCOUNTER — OFFICE VISIT (OUTPATIENT)
Dept: INTERNAL MEDICINE | Facility: CLINIC | Age: 62
End: 2022-03-10

## 2022-03-10 VITALS
TEMPERATURE: 98 F | RESPIRATION RATE: 18 BRPM | HEIGHT: 66 IN | DIASTOLIC BLOOD PRESSURE: 74 MMHG | SYSTOLIC BLOOD PRESSURE: 150 MMHG | WEIGHT: 238 LBS | HEART RATE: 80 BPM | OXYGEN SATURATION: 98 % | BODY MASS INDEX: 38.25 KG/M2

## 2022-03-10 DIAGNOSIS — R73.9 HYPERGLYCEMIA: Primary | ICD-10-CM

## 2022-03-10 DIAGNOSIS — F41.9 ANXIETY: ICD-10-CM

## 2022-03-10 DIAGNOSIS — E78.00 HIGH CHOLESTEROL: ICD-10-CM

## 2022-03-10 DIAGNOSIS — I10 PRIMARY HYPERTENSION: ICD-10-CM

## 2022-03-10 PROCEDURE — 99214 OFFICE O/P EST MOD 30 MIN: CPT | Performed by: NURSE PRACTITIONER

## 2022-03-10 RX ORDER — HYDROXYZINE HYDROCHLORIDE 25 MG/1
25 TABLET, FILM COATED ORAL 3 TIMES DAILY PRN
Qty: 30 TABLET | Refills: 3 | Status: SHIPPED | OUTPATIENT
Start: 2022-03-10 | End: 2022-06-13

## 2022-03-10 RX ORDER — ROSUVASTATIN CALCIUM 40 MG/1
40 TABLET, COATED ORAL NIGHTLY
Qty: 90 TABLET | Refills: 1 | Status: SHIPPED | OUTPATIENT
Start: 2022-03-10 | End: 2022-09-09

## 2022-03-10 RX ORDER — AMLODIPINE BESYLATE 10 MG/1
10 TABLET ORAL DAILY
Qty: 90 TABLET | Refills: 1 | Status: SHIPPED | OUTPATIENT
Start: 2022-03-10 | End: 2022-09-09

## 2022-03-10 RX ORDER — LOSARTAN POTASSIUM 50 MG/1
50 TABLET ORAL DAILY
Qty: 90 TABLET | Refills: 1 | Status: SHIPPED | OUTPATIENT
Start: 2022-03-10 | End: 2022-09-09

## 2022-03-10 RX ORDER — CITALOPRAM 40 MG/1
40 TABLET ORAL DAILY
Qty: 90 TABLET | Refills: 3 | Status: SHIPPED | OUTPATIENT
Start: 2022-03-10 | End: 2023-03-24

## 2022-03-10 RX ORDER — CITALOPRAM 10 MG/1
40 TABLET ORAL DAILY
Qty: 90 TABLET | Refills: 1 | Status: SHIPPED | OUTPATIENT
Start: 2022-03-10 | End: 2022-03-10

## 2022-06-13 RX ORDER — HYDROXYZINE HYDROCHLORIDE 25 MG/1
25 TABLET, FILM COATED ORAL 3 TIMES DAILY PRN
Qty: 30 TABLET | Refills: 3 | Status: SHIPPED | OUTPATIENT
Start: 2022-06-13 | End: 2022-07-06

## 2022-07-06 RX ORDER — HYDROXYZINE HYDROCHLORIDE 25 MG/1
25 TABLET, FILM COATED ORAL 3 TIMES DAILY PRN
Qty: 30 TABLET | Refills: 3 | Status: SHIPPED | OUTPATIENT
Start: 2022-07-06

## 2022-09-07 DIAGNOSIS — Z00.00 ANNUAL PHYSICAL EXAM: Primary | ICD-10-CM

## 2022-09-07 DIAGNOSIS — R73.9 HYPERGLYCEMIA: ICD-10-CM

## 2022-09-09 RX ORDER — AMLODIPINE BESYLATE 10 MG/1
10 TABLET ORAL DAILY
Qty: 90 TABLET | Refills: 1 | Status: SHIPPED | OUTPATIENT
Start: 2022-09-09 | End: 2023-03-24

## 2022-09-09 RX ORDER — LOSARTAN POTASSIUM 50 MG/1
50 TABLET ORAL DAILY
Qty: 90 TABLET | Refills: 1 | Status: SHIPPED | OUTPATIENT
Start: 2022-09-09 | End: 2023-03-24

## 2022-09-09 RX ORDER — ROSUVASTATIN CALCIUM 40 MG/1
40 TABLET, COATED ORAL NIGHTLY
Qty: 90 TABLET | Refills: 1 | Status: SHIPPED | OUTPATIENT
Start: 2022-09-09 | End: 2023-03-24

## 2022-09-15 LAB
ALBUMIN SERPL-MCNC: 5.1 G/DL (ref 3.5–5.2)
ALBUMIN/GLOB SERPL: 2.7 G/DL
ALP SERPL-CCNC: 44 U/L (ref 39–117)
ALT SERPL-CCNC: 28 U/L (ref 1–33)
APPEARANCE UR: CLEAR
AST SERPL-CCNC: 39 U/L (ref 1–32)
BACTERIA #/AREA URNS HPF: ABNORMAL /HPF
BASOPHILS # BLD AUTO: 0.08 10*3/MM3 (ref 0–0.2)
BASOPHILS NFR BLD AUTO: 1.7 % (ref 0–1.5)
BILIRUB SERPL-MCNC: 0.3 MG/DL (ref 0–1.2)
BILIRUB UR QL STRIP: NEGATIVE
BUN SERPL-MCNC: 10 MG/DL (ref 6–20)
BUN/CREAT SERPL: 15.6 (ref 7–25)
CALCIUM SERPL-MCNC: 10 MG/DL (ref 8.6–10.5)
CASTS URNS MICRO: ABNORMAL
CHLORIDE SERPL-SCNC: 99 MMOL/L (ref 98–107)
CHOLEST SERPL-MCNC: 183 MG/DL (ref 0–200)
CO2 SERPL-SCNC: 29 MMOL/L (ref 22–29)
COLOR UR: YELLOW
CREAT SERPL-MCNC: 0.64 MG/DL (ref 0.57–1)
EGFRCR-CYS SERPLBLD CKD-EPI 2021: 101.9 ML/MIN/1.73
EOSINOPHIL # BLD AUTO: 0.17 10*3/MM3 (ref 0–0.4)
EOSINOPHIL NFR BLD AUTO: 3.6 % (ref 0.3–6.2)
EPI CELLS #/AREA URNS HPF: ABNORMAL /HPF
ERYTHROCYTE [DISTWIDTH] IN BLOOD BY AUTOMATED COUNT: 12.5 % (ref 12.3–15.4)
GLOBULIN SER CALC-MCNC: 1.9 GM/DL
GLUCOSE SERPL-MCNC: 83 MG/DL (ref 65–99)
GLUCOSE UR QL STRIP: NEGATIVE
HBA1C MFR BLD: 4.6 % (ref 4.8–5.6)
HCT VFR BLD AUTO: 39.3 % (ref 34–46.6)
HDLC SERPL-MCNC: 76 MG/DL (ref 40–60)
HGB BLD-MCNC: 13.7 G/DL (ref 12–15.9)
HGB UR QL STRIP: ABNORMAL
IMM GRANULOCYTES # BLD AUTO: 0.06 10*3/MM3 (ref 0–0.05)
IMM GRANULOCYTES NFR BLD AUTO: 1.3 % (ref 0–0.5)
KETONES UR QL STRIP: NEGATIVE
LDLC SERPL CALC-MCNC: 66 MG/DL (ref 0–100)
LDLC/HDLC SERPL: 0.72 {RATIO}
LEUKOCYTE ESTERASE UR QL STRIP: NEGATIVE
LYMPHOCYTES # BLD AUTO: 1.87 10*3/MM3 (ref 0.7–3.1)
LYMPHOCYTES NFR BLD AUTO: 40 % (ref 19.6–45.3)
MCH RBC QN AUTO: 36.1 PG (ref 26.6–33)
MCHC RBC AUTO-ENTMCNC: 34.9 G/DL (ref 31.5–35.7)
MCV RBC AUTO: 103.7 FL (ref 79–97)
MONOCYTES # BLD AUTO: 0.57 10*3/MM3 (ref 0.1–0.9)
MONOCYTES NFR BLD AUTO: 12.2 % (ref 5–12)
NEUTROPHILS # BLD AUTO: 1.93 10*3/MM3 (ref 1.7–7)
NEUTROPHILS NFR BLD AUTO: 41.2 % (ref 42.7–76)
NITRITE UR QL STRIP: NEGATIVE
NRBC BLD AUTO-RTO: 0 /100 WBC (ref 0–0.2)
PH UR STRIP: 6.5 [PH] (ref 5–8)
PLATELET # BLD AUTO: 183 10*3/MM3 (ref 140–450)
POTASSIUM SERPL-SCNC: 4.2 MMOL/L (ref 3.5–5.2)
PROT SERPL-MCNC: 7 G/DL (ref 6–8.5)
PROT UR QL STRIP: ABNORMAL
RBC # BLD AUTO: 3.79 10*6/MM3 (ref 3.77–5.28)
RBC #/AREA URNS HPF: ABNORMAL /HPF
SODIUM SERPL-SCNC: 142 MMOL/L (ref 136–145)
SP GR UR STRIP: 1.01 (ref 1–1.03)
TRIGL SERPL-MCNC: 263 MG/DL (ref 0–150)
TSH SERPL DL<=0.005 MIU/L-ACNC: 2.18 UIU/ML (ref 0.27–4.2)
UROBILINOGEN UR STRIP-MCNC: ABNORMAL MG/DL
VLDLC SERPL CALC-MCNC: 41 MG/DL (ref 5–40)
WBC # BLD AUTO: 4.68 10*3/MM3 (ref 3.4–10.8)
WBC #/AREA URNS HPF: ABNORMAL /HPF

## 2022-09-21 ENCOUNTER — OFFICE VISIT (OUTPATIENT)
Dept: INTERNAL MEDICINE | Facility: CLINIC | Age: 62
End: 2022-09-21

## 2022-09-21 VITALS
TEMPERATURE: 97.7 F | HEART RATE: 62 BPM | OXYGEN SATURATION: 98 % | HEIGHT: 66 IN | SYSTOLIC BLOOD PRESSURE: 168 MMHG | RESPIRATION RATE: 18 BRPM | DIASTOLIC BLOOD PRESSURE: 80 MMHG | WEIGHT: 239 LBS | BODY MASS INDEX: 38.41 KG/M2

## 2022-09-21 DIAGNOSIS — E78.00 HIGH CHOLESTEROL: Primary | ICD-10-CM

## 2022-09-21 DIAGNOSIS — I10 PRIMARY HYPERTENSION: ICD-10-CM

## 2022-09-21 DIAGNOSIS — F41.9 ANXIETY: ICD-10-CM

## 2022-09-21 DIAGNOSIS — Z12.31 SCREENING MAMMOGRAM, ENCOUNTER FOR: ICD-10-CM

## 2022-09-21 PROCEDURE — 99213 OFFICE O/P EST LOW 20 MIN: CPT | Performed by: NURSE PRACTITIONER

## 2022-09-21 RX ORDER — FUROSEMIDE 20 MG/1
20 TABLET ORAL 2 TIMES DAILY
Qty: 180 TABLET | Refills: 3 | Status: SHIPPED | OUTPATIENT
Start: 2022-09-21

## 2022-09-21 NOTE — PROGRESS NOTES
Subjective   Shaunna Carreno is a 61 y.o. female. Patient is here today for   Chief Complaint   Patient presents with   • Hypertension          Vitals:    09/21/22 0801   BP: 168/80   Pulse: 62   Resp: 18   Temp: 97.7 °F (36.5 °C)   SpO2: 98%     Body mass index is 38.58 kg/m².  The following portions of the patient's history were reviewed and updated as appropriate: allergies, current medications, past family history, past medical history, past social history, past surgical history and problem list.    Past Medical History:   Diagnosis Date   • Hyperlipidemia    • Hypertension       No Known Allergies   Social History     Socioeconomic History   • Marital status:    Tobacco Use   • Smoking status: Never Smoker   • Smokeless tobacco: Never Used   Substance and Sexual Activity   • Alcohol use: Yes     Alcohol/week: 4.0 standard drinks     Types: 4 Glasses of wine per week     Comment: 1-2 GLASSES OF WINE A NIGHT   • Drug use: Never   • Sexual activity: Yes     Partners: Female, Male        Current Outpatient Medications:   •  amLODIPine (NORVASC) 10 MG tablet, Take 1 tablet by mouth Daily. Needs an APPT for further refills, Disp: 90 tablet, Rfl: 1  •  cetirizine (zyrTEC) 10 MG tablet, Take 1 tablet by mouth Daily., Disp: 90 tablet, Rfl: 3  •  citalopram (CeleXA) 40 MG tablet, Take 1 tablet by mouth Daily., Disp: 90 tablet, Rfl: 3  •  furosemide (LASIX) 20 MG tablet, Take 1 tablet by mouth 2 (Two) Times a Day., Disp: 180 tablet, Rfl: 3  •  hydrOXYzine (ATARAX) 25 MG tablet, Take 1 tablet by mouth 3 (Three) Times a Day As Needed for Anxiety., Disp: 30 tablet, Rfl: 3  •  losartan (COZAAR) 50 MG tablet, Take 1 tablet by mouth Daily., Disp: 90 tablet, Rfl: 1  •  Probiotic Product (PROBIOTIC DAILY PO), Take  by mouth., Disp: , Rfl:   •  rosuvastatin (CRESTOR) 40 MG tablet, Take 1 tablet by mouth Every Night., Disp: 90 tablet, Rfl: 1     Objective     History of Present Illness  Shaunna is a 61 year old female patient  who is here for a follow up for HTN and HLD. She has been doing well. She is eating a healthy diet and has an active job. She has been compliant with her medication and is not experiencing any side effects. She has been monitoring her Blood pressure at home and it has been normal    She overall feels well and has no complaints. Anxiety has been well controlled on citalopram . She takes hydroxyzine rarely for anxiety  I reviewed labs with her in detail  Orders Only on 09/07/2022   Component Date Value Ref Range Status   • WBC 09/14/2022 4.68  3.40 - 10.80 10*3/mm3 Final   • RBC 09/14/2022 3.79  3.77 - 5.28 10*6/mm3 Final   • Hemoglobin 09/14/2022 13.7  12.0 - 15.9 g/dL Final   • Hematocrit 09/14/2022 39.3  34.0 - 46.6 % Final   • MCV 09/14/2022 103.7 (A) 79.0 - 97.0 fL Final   • MCH 09/14/2022 36.1 (A) 26.6 - 33.0 pg Final   • MCHC 09/14/2022 34.9  31.5 - 35.7 g/dL Final   • RDW 09/14/2022 12.5  12.3 - 15.4 % Final   • Platelets 09/14/2022 183  140 - 450 10*3/mm3 Final   • Neutrophil Rel % 09/14/2022 41.2 (A) 42.7 - 76.0 % Final   • Lymphocyte Rel % 09/14/2022 40.0  19.6 - 45.3 % Final   • Monocyte Rel % 09/14/2022 12.2 (A) 5.0 - 12.0 % Final   • Eosinophil Rel % 09/14/2022 3.6  0.3 - 6.2 % Final   • Basophil Rel % 09/14/2022 1.7 (A) 0.0 - 1.5 % Final   • Neutrophils Absolute 09/14/2022 1.93  1.70 - 7.00 10*3/mm3 Final   • Lymphocytes Absolute 09/14/2022 1.87  0.70 - 3.10 10*3/mm3 Final   • Monocytes Absolute 09/14/2022 0.57  0.10 - 0.90 10*3/mm3 Final   • Eosinophils Absolute 09/14/2022 0.17  0.00 - 0.40 10*3/mm3 Final   • Basophils Absolute 09/14/2022 0.08  0.00 - 0.20 10*3/mm3 Final   • Immature Granulocyte Rel % 09/14/2022 1.3 (A) 0.0 - 0.5 % Final   • Immature Grans Absolute 09/14/2022 0.06 (A) 0.00 - 0.05 10*3/mm3 Final   • nRBC 09/14/2022 0.0  0.0 - 0.2 /100 WBC Final   • Glucose 09/14/2022 83  65 - 99 mg/dL Final   • BUN 09/14/2022 10  6 - 20 mg/dL Final   • Creatinine 09/14/2022 0.64  0.57 - 1.00 mg/dL Final    • EGFR Result 09/14/2022 101.9  >60.0 mL/min/1.73 Final    Comment: National Kidney Foundation and American Society of  Nephrology (ASN) Task Force recommended calculation based  on the Chronic Kidney Disease Epidemiology Collaboration  (CKD-EPI) equation refit without adjustment for race.  GFR Normal >60  Chronic Kidney Disease <60  Kidney Failure <15     • BUN/Creatinine Ratio 09/14/2022 15.6  7.0 - 25.0 Final   • Sodium 09/14/2022 142  136 - 145 mmol/L Final   • Potassium 09/14/2022 4.2  3.5 - 5.2 mmol/L Final   • Chloride 09/14/2022 99  98 - 107 mmol/L Final   • Total CO2 09/14/2022 29.0  22.0 - 29.0 mmol/L Final   • Calcium 09/14/2022 10.0  8.6 - 10.5 mg/dL Final   • Total Protein 09/14/2022 7.0  6.0 - 8.5 g/dL Final   • Albumin 09/14/2022 5.10  3.50 - 5.20 g/dL Final   • Globulin 09/14/2022 1.9  gm/dL Final   • A/G Ratio 09/14/2022 2.7  g/dL Final   • Total Bilirubin 09/14/2022 0.3  0.0 - 1.2 mg/dL Final   • Alkaline Phosphatase 09/14/2022 44  39 - 117 U/L Final   • AST (SGOT) 09/14/2022 39 (A) 1 - 32 U/L Final   • ALT (SGPT) 09/14/2022 28  1 - 33 U/L Final   • Hemoglobin A1C 09/14/2022 4.60 (A) 4.80 - 5.60 % Final    Comment: Hemoglobin A1C Ranges:  Increased Risk for Diabetes  5.7% to 6.4%  Diabetes                     >= 6.5%  Diabetic Goal                < 7.0%     • Total Cholesterol 09/14/2022 183  0 - 200 mg/dL Final    Comment: Cholesterol Reference Ranges  (U.S. Department of Health and Human Services ATP III  Classifications)  Desirable          <200 mg/dL  Borderline High    200-239 mg/dL  High Risk          >240 mg/dL  Triglyceride Reference Ranges  (U.S. Department of Health and Human Services ATP III  Classifications)  Normal           <150 mg/dL  Borderline High  150-199 mg/dL  High             200-499 mg/dL  Very High        >500 mg/dL  HDL Reference Ranges  (U.S. Department of Health and Human Services ATP III  Classifications)  Low     <40 mg/dl (major risk factor for CHD)  High    >60  mg/dl ('negative' risk factor for CHD)  LDL Reference Ranges  (U.S. Department of Health and Human Services ATP III  Classifications)  Optimal          <100 mg/dL  Near Optimal     100-129 mg/dL  Borderline High  130-159 mg/dL  High             160-189 mg/dL  Very High        >189 mg/dL     • Triglycerides 09/14/2022 263 (A) 0 - 150 mg/dL Final   • HDL Cholesterol 09/14/2022 76 (A) 40 - 60 mg/dL Final   • VLDL Cholesterol Eduardo 09/14/2022 41 (A) 5 - 40 mg/dL Final   • LDL Chol Calc ([facility]) 09/14/2022 66  0 - 100 mg/dL Final   • LDL/HDL RATIO 09/14/2022 0.72   Final   • TSH 09/14/2022 2.180  0.270 - 4.200 uIU/mL Final   • Specific Gravity, UA 09/14/2022 1.010  1.005 - 1.030 Final   • pH, UA 09/14/2022 6.5  5.0 - 8.0 Final   • Color, UA 09/14/2022 Yellow   Final    REFERENCE RANGE: Yellow, Straw   • Appearance, UA 09/14/2022 Clear  Clear Final   • Leukocytes, UA 09/14/2022 Negative  Negative Final   • Protein 09/14/2022 See below: (A) Negative Final    >=300 mg/dL (3+)   • Glucose, UA 09/14/2022 Negative  Negative Final   • Ketones 09/14/2022 Negative  Negative Final   • Blood, UA 09/14/2022 See below: (A) Negative Final    Small (1+)   • Bilirubin, UA 09/14/2022 Negative  Negative Final   • Urobilinogen, UA 09/14/2022 Comment   Final    Comment: 0.2 E.U./dL  REFERENCE RANGE: 0.2 - 1.0 E.U./dL     • Nitrite, UA 09/14/2022 Negative  Negative Final   • WBC, UA 09/14/2022 3-5 (A) /HPF Final    REFERENCE RANGE: None Seen, 0-2   • RBC, UA 09/14/2022 0-2  /HPF Final    REFERENCE RANGE: None Seen, 0-2   • Epithelial Cells (non renal) 09/14/2022 0-2  /HPF Final    REFERENCE RANGE: None Seen, 0-2   • Cast Type 09/14/2022 Comment   Final    HYALINE CASTS, UA            0-2              /LPF       None Seen  N   • Bacteria, UA 09/14/2022 1+ (A) None Seen /HPF Final           Review of Systems   Constitutional: Negative for fatigue.   Respiratory: Negative for chest tightness and shortness of breath.    Cardiovascular: Positive for  leg swelling. Negative for chest pain and palpitations.   Genitourinary: Negative.    Psychiatric/Behavioral: The patient is not nervous/anxious.        Physical Exam  Vitals and nursing note reviewed.   Constitutional:       General: She is not in acute distress.     Appearance: Normal appearance. She is well-developed and well-groomed.   HENT:      Head: Normocephalic.   Cardiovascular:      Rate and Rhythm: Normal rate and regular rhythm.   Pulmonary:      Effort: Pulmonary effort is normal.      Breath sounds: Normal breath sounds.   Musculoskeletal:      Right lower leg: No edema.      Left lower leg: No edema.   Skin:     General: Skin is warm and dry.   Neurological:      Mental Status: She is alert and oriented to person, place, and time.         ASSESSMENT     Problems Addressed this Visit     High cholesterol - Primary    Hypertension    Relevant Medications    furosemide (LASIX) 20 MG tablet    Anxiety      Other Visit Diagnoses     Screening mammogram, encounter for        Relevant Orders    Mammo screening digital tomosynthesis bilateral w CAD      Diagnoses       Codes Comments    High cholesterol    -  Primary ICD-10-CM: E78.00  ICD-9-CM: 272.0     Primary hypertension     ICD-10-CM: I10  ICD-9-CM: 401.9     Anxiety     ICD-10-CM: F41.9  ICD-9-CM: 300.00     Screening mammogram, encounter for     ICD-10-CM: Z12.31  ICD-9-CM: V76.12           PLAN  HTN- BP is elevated today but is well controlled at home. Continue current medications and continue to monitor at home  HLD- continue statin   Anxiety- well controlled  Mammogram ordered     Return in about 6 months (around 3/21/2023) for Annual physical, with labs.

## 2022-10-26 ENCOUNTER — HOSPITAL ENCOUNTER (OUTPATIENT)
Dept: MAMMOGRAPHY | Facility: HOSPITAL | Age: 62
Discharge: HOME | End: 2022-10-26
Admitting: NURSE PRACTITIONER

## 2022-10-26 DIAGNOSIS — Z12.31 SCREENING MAMMOGRAM, ENCOUNTER FOR: ICD-10-CM

## 2022-10-26 PROCEDURE — 77067 SCR MAMMO BI INCL CAD: CPT

## 2022-10-26 PROCEDURE — 77063 BREAST TOMOSYNTHESIS BI: CPT

## 2023-03-09 DIAGNOSIS — Z00.00 ANNUAL PHYSICAL EXAM: Primary | ICD-10-CM

## 2023-03-24 RX ORDER — ROSUVASTATIN CALCIUM 40 MG/1
40 TABLET, COATED ORAL NIGHTLY
Qty: 90 TABLET | Refills: 1 | Status: SHIPPED | OUTPATIENT
Start: 2023-03-24

## 2023-03-24 RX ORDER — LOSARTAN POTASSIUM 50 MG/1
50 TABLET ORAL DAILY
Qty: 90 TABLET | Refills: 1 | Status: SHIPPED | OUTPATIENT
Start: 2023-03-24

## 2023-03-24 RX ORDER — CITALOPRAM 40 MG/1
40 TABLET ORAL DAILY
Qty: 90 TABLET | Refills: 1 | Status: SHIPPED | OUTPATIENT
Start: 2023-03-24

## 2023-03-24 RX ORDER — AMLODIPINE BESYLATE 10 MG/1
10 TABLET ORAL DAILY
Qty: 90 TABLET | Refills: 1 | Status: SHIPPED | OUTPATIENT
Start: 2023-03-24

## 2023-04-25 LAB
ALBUMIN SERPL-MCNC: 5 G/DL (ref 3.5–5.2)
ALBUMIN/GLOB SERPL: 2.1 G/DL
ALP SERPL-CCNC: 43 U/L (ref 39–117)
ALT SERPL-CCNC: 37 U/L (ref 1–33)
APPEARANCE UR: CLEAR
AST SERPL-CCNC: 66 U/L (ref 1–32)
BACTERIA #/AREA URNS HPF: ABNORMAL /HPF
BASOPHILS # BLD AUTO: 0.05 10*3/MM3 (ref 0–0.2)
BASOPHILS NFR BLD AUTO: 1 % (ref 0–1.5)
BILIRUB SERPL-MCNC: 0.3 MG/DL (ref 0–1.2)
BILIRUB UR QL STRIP: NEGATIVE
BUN SERPL-MCNC: 11 MG/DL (ref 8–23)
BUN/CREAT SERPL: 14.3 (ref 7–25)
CALCIUM SERPL-MCNC: 10.5 MG/DL (ref 8.6–10.5)
CASTS URNS MICRO: ABNORMAL
CHLORIDE SERPL-SCNC: 97 MMOL/L (ref 98–107)
CHOLEST SERPL-MCNC: 209 MG/DL (ref 0–200)
CO2 SERPL-SCNC: 27.7 MMOL/L (ref 22–29)
COLOR UR: YELLOW
CREAT SERPL-MCNC: 0.77 MG/DL (ref 0.57–1)
EGFRCR SERPLBLD CKD-EPI 2021: 88.4 ML/MIN/1.73
EOSINOPHIL # BLD AUTO: 0.11 10*3/MM3 (ref 0–0.4)
EOSINOPHIL NFR BLD AUTO: 2.3 % (ref 0.3–6.2)
EPI CELLS #/AREA URNS HPF: ABNORMAL /HPF
ERYTHROCYTE [DISTWIDTH] IN BLOOD BY AUTOMATED COUNT: 13.5 % (ref 12.3–15.4)
GLOBULIN SER CALC-MCNC: 2.4 GM/DL
GLUCOSE SERPL-MCNC: 91 MG/DL (ref 65–99)
GLUCOSE UR QL STRIP: NEGATIVE
HCT VFR BLD AUTO: 34.7 % (ref 34–46.6)
HDLC SERPL-MCNC: 113 MG/DL (ref 40–60)
HGB BLD-MCNC: 12.6 G/DL (ref 12–15.9)
HGB UR QL STRIP: NEGATIVE
IMM GRANULOCYTES # BLD AUTO: 0.06 10*3/MM3 (ref 0–0.05)
IMM GRANULOCYTES NFR BLD AUTO: 1.3 % (ref 0–0.5)
KETONES UR QL STRIP: NEGATIVE
LDLC SERPL CALC-MCNC: 61 MG/DL (ref 0–100)
LDLC/HDLC SERPL: 0.44 {RATIO}
LEUKOCYTE ESTERASE UR QL STRIP: NEGATIVE
LYMPHOCYTES # BLD AUTO: 1.64 10*3/MM3 (ref 0.7–3.1)
LYMPHOCYTES NFR BLD AUTO: 34.3 % (ref 19.6–45.3)
MCH RBC QN AUTO: 37.4 PG (ref 26.6–33)
MCHC RBC AUTO-ENTMCNC: 36.3 G/DL (ref 31.5–35.7)
MCV RBC AUTO: 103 FL (ref 79–97)
MONOCYTES # BLD AUTO: 0.45 10*3/MM3 (ref 0.1–0.9)
MONOCYTES NFR BLD AUTO: 9.4 % (ref 5–12)
NEUTROPHILS # BLD AUTO: 2.47 10*3/MM3 (ref 1.7–7)
NEUTROPHILS NFR BLD AUTO: 51.7 % (ref 42.7–76)
NITRITE UR QL STRIP: NEGATIVE
NRBC BLD AUTO-RTO: 0.2 /100 WBC (ref 0–0.2)
PH UR STRIP: 7 [PH] (ref 5–8)
PLATELET # BLD AUTO: 166 10*3/MM3 (ref 140–450)
POTASSIUM SERPL-SCNC: 4 MMOL/L (ref 3.5–5.2)
PROT SERPL-MCNC: 7.4 G/DL (ref 6–8.5)
PROT UR QL STRIP: ABNORMAL
RBC # BLD AUTO: 3.37 10*6/MM3 (ref 3.77–5.28)
RBC #/AREA URNS HPF: ABNORMAL /HPF
SODIUM SERPL-SCNC: 140 MMOL/L (ref 136–145)
SP GR UR STRIP: 1.01 (ref 1–1.03)
TRIGL SERPL-MCNC: 231 MG/DL (ref 0–150)
TSH SERPL DL<=0.005 MIU/L-ACNC: 2.02 UIU/ML (ref 0.27–4.2)
UROBILINOGEN UR STRIP-MCNC: ABNORMAL MG/DL
VLDLC SERPL CALC-MCNC: 35 MG/DL (ref 5–40)
WBC # BLD AUTO: 4.78 10*3/MM3 (ref 3.4–10.8)
WBC #/AREA URNS HPF: ABNORMAL /HPF
YEAST #/AREA URNS HPF: ABNORMAL /HPF

## 2023-04-28 NOTE — PROGRESS NOTES
Subjective   Shaunna Carreno is a 62 y.o. female. Patient is here today for   Chief Complaint   Patient presents with   • Annual Exam          Vitals:    05/01/23 0742   BP: 148/80   Pulse: 89   Resp: 16   Temp: 97.7 °F (36.5 °C)   SpO2: 98%     Body mass index is 37.12 kg/m².    The following portions of the patient's history were reviewed and updated as appropriate: allergies, current medications, past family history, past medical history, past social history, past surgical history and problem list.    Past Medical History:   Diagnosis Date   • Hyperlipidemia    • Hypertension       No Known Allergies   Social History     Socioeconomic History   • Marital status:    Tobacco Use   • Smoking status: Never   • Smokeless tobacco: Never   Vaping Use   • Vaping Use: Never used   Substance and Sexual Activity   • Alcohol use: Yes     Alcohol/week: 4.0 standard drinks     Types: 4 Glasses of wine per week     Comment: 1-2 GLASSES OF WINE A NIGHT   • Drug use: Never   • Sexual activity: Yes     Partners: Female, Male        Current Outpatient Medications:   •  amLODIPine (NORVASC) 10 MG tablet, Take 1 tablet by mouth Daily., Disp: 90 tablet, Rfl: 3  •  cetirizine (zyrTEC) 10 MG tablet, Take 1 tablet by mouth Daily., Disp: 90 tablet, Rfl: 3  •  citalopram (CeleXA) 40 MG tablet, Take 1 tablet by mouth Daily., Disp: 90 tablet, Rfl: 1  •  furosemide (LASIX) 20 MG tablet, Take 1 tablet by mouth 2 (Two) Times a Day., Disp: 180 tablet, Rfl: 3  •  hydrOXYzine (ATARAX) 25 MG tablet, Take 1 tablet by mouth 3 (Three) Times a Day As Needed for Anxiety., Disp: 90 tablet, Rfl: 3  •  losartan (COZAAR) 50 MG tablet, Take 1 tablet by mouth Daily., Disp: 90 tablet, Rfl: 3  •  Probiotic Product (PROBIOTIC DAILY PO), Take  by mouth., Disp: , Rfl:   •  rosuvastatin (CRESTOR) 40 MG tablet, Take 1 tablet by mouth Every Night., Disp: 90 tablet, Rfl: 3       Objective   History of Present Illness   Shaunna Carreno 62 y.o. female who  presents for an Annual physical exam.   Lab results discussed in detail with the patient.  Plan to update vaccines if needed today.    Health Habits:  Dental Exam. not up to date - .  Eye Exam. up to date  Exercise: 5 times/week.  Current exercise activities include: active at work     Preventative counseling  Discussed face to face the importance of healthy diet and exercise.     Lab Results (most recent)     No visits with results within 1 Month(s) from this visit.   Latest known visit with results is:   Orders Only on 03/09/2023   Component Date Value Ref Range Status   • WBC 04/24/2023 4.78  3.40 - 10.80 10*3/mm3 Final   • RBC 04/24/2023 3.37 (L)  3.77 - 5.28 10*6/mm3 Final   • Hemoglobin 04/24/2023 12.6  12.0 - 15.9 g/dL Final   • Hematocrit 04/24/2023 34.7  34.0 - 46.6 % Final   • MCV 04/24/2023 103.0 (H)  79.0 - 97.0 fL Final   • MCH 04/24/2023 37.4 (H)  26.6 - 33.0 pg Final   • MCHC 04/24/2023 36.3 (H)  31.5 - 35.7 g/dL Final   • RDW 04/24/2023 13.5  12.3 - 15.4 % Final   • Platelets 04/24/2023 166  140 - 450 10*3/mm3 Final   • Neutrophil Rel % 04/24/2023 51.7  42.7 - 76.0 % Final   • Lymphocyte Rel % 04/24/2023 34.3  19.6 - 45.3 % Final   • Monocyte Rel % 04/24/2023 9.4  5.0 - 12.0 % Final   • Eosinophil Rel % 04/24/2023 2.3  0.3 - 6.2 % Final   • Basophil Rel % 04/24/2023 1.0  0.0 - 1.5 % Final   • Neutrophils Absolute 04/24/2023 2.47  1.70 - 7.00 10*3/mm3 Final   • Lymphocytes Absolute 04/24/2023 1.64  0.70 - 3.10 10*3/mm3 Final   • Monocytes Absolute 04/24/2023 0.45  0.10 - 0.90 10*3/mm3 Final   • Eosinophils Absolute 04/24/2023 0.11  0.00 - 0.40 10*3/mm3 Final   • Basophils Absolute 04/24/2023 0.05  0.00 - 0.20 10*3/mm3 Final   • Immature Granulocyte Rel % 04/24/2023 1.3 (H)  0.0 - 0.5 % Final   • Immature Grans Absolute 04/24/2023 0.06 (H)  0.00 - 0.05 10*3/mm3 Final   • nRBC 04/24/2023 0.2  0.0 - 0.2 /100 WBC Final   • Glucose 04/24/2023 91  65 - 99 mg/dL Final   • BUN 04/24/2023 11  8 - 23 mg/dL  Final   • Creatinine 04/24/2023 0.77  0.57 - 1.00 mg/dL Final   • EGFR Result 04/24/2023 88.4  >60.0 mL/min/1.73 Final    Comment: GFR Normal >60  Chronic Kidney Disease <60  Kidney Failure <15     • BUN/Creatinine Ratio 04/24/2023 14.3  7.0 - 25.0 Final   • Sodium 04/24/2023 140  136 - 145 mmol/L Final   • Potassium 04/24/2023 4.0  3.5 - 5.2 mmol/L Final   • Chloride 04/24/2023 97 (L)  98 - 107 mmol/L Final   • Total CO2 04/24/2023 27.7  22.0 - 29.0 mmol/L Final   • Calcium 04/24/2023 10.5  8.6 - 10.5 mg/dL Final   • Total Protein 04/24/2023 7.4  6.0 - 8.5 g/dL Final   • Albumin 04/24/2023 5.0  3.5 - 5.2 g/dL Final   • Globulin 04/24/2023 2.4  gm/dL Final   • A/G Ratio 04/24/2023 2.1  g/dL Final   • Total Bilirubin 04/24/2023 0.3  0.0 - 1.2 mg/dL Final   • Alkaline Phosphatase 04/24/2023 43  39 - 117 U/L Final   • AST (SGOT) 04/24/2023 66 (H)  1 - 32 U/L Final   • ALT (SGPT) 04/24/2023 37 (H)  1 - 33 U/L Final   • Total Cholesterol 04/24/2023 209 (H)  0 - 200 mg/dL Final    Comment: Cholesterol Reference Ranges  (U.S. Department of Health and Human Services ATP III  Classifications)  Desirable          <200 mg/dL  Borderline High    200-239 mg/dL  High Risk          >240 mg/dL  Triglyceride Reference Ranges  (U.S. Department of Health and Human Services ATP III  Classifications)  Normal           <150 mg/dL  Borderline High  150-199 mg/dL  High             200-499 mg/dL  Very High        >500 mg/dL  HDL Reference Ranges  (U.S. Department of Health and Human Services ATP III  Classifications)  Low     <40 mg/dl (major risk factor for CHD)  High    >60 mg/dl ('negative' risk factor for CHD)  LDL Reference Ranges  (U.S. Department of Health and Human Services ATP III  Classifications)  Optimal          <100 mg/dL  Near Optimal     100-129 mg/dL  Borderline High  130-159 mg/dL  High             160-189 mg/dL  Very High        >189 mg/dL     • Triglycerides 04/24/2023 231 (H)  0 - 150 mg/dL Final   • HDL Cholesterol  04/24/2023 113 (H)  40 - 60 mg/dL Final   • VLDL Cholesterol Eduardo 04/24/2023 35  5 - 40 mg/dL Final   • LDL Chol Calc ([facility]) 04/24/2023 61  0 - 100 mg/dL Final   • LDL/HDL RATIO 04/24/2023 0.44   Final   • TSH 04/24/2023 2.020  0.270 - 4.200 uIU/mL Final   • Specific Gravity, UA 04/24/2023 1.009  1.005 - 1.030 Final   • pH, UA 04/24/2023 7.0  5.0 - 8.0 Final   • Color, UA 04/24/2023 Yellow   Final    REFERENCE RANGE: Yellow, Straw   • Appearance, UA 04/24/2023 Clear  Clear Final   • Leukocytes, UA 04/24/2023 Negative  Negative Final   • Protein 04/24/2023 See below: (A)  Negative Final    100 mg/dL (2+)   • Glucose, UA 04/24/2023 Negative  Negative Final   • Ketones 04/24/2023 Negative  Negative Final   • Blood, UA 04/24/2023 Negative  Negative Final   • Bilirubin, UA 04/24/2023 Negative  Negative Final   • Urobilinogen, UA 04/24/2023 Comment   Final    Comment: 0.2 E.U./dL  REFERENCE RANGE: 0.2 - 1.0 E.U./dL     • Nitrite, UA 04/24/2023 Negative  Negative Final   • WBC, UA 04/24/2023 Comment  /HPF Final    Comment: None Seen  REFERENCE RANGE: None Seen, 0-2     • RBC, UA 04/24/2023 Comment  /HPF Final    Comment: None Seen  REFERENCE RANGE: None Seen, 0-2     • Epithelial Cells (non renal) 04/24/2023 3-6 (A)  /HPF Final    REFERENCE RANGE: None Seen, 0-2   • Cast Type 04/24/2023 Comment   Final    HYALINE CASTS, UA            0-2              /LPF       None Seen  N   • Bacteria, UA 04/24/2023 Trace (A)  None Seen /HPF Final   • Yeast, UA 04/24/2023 Comment  None Seen /HPF Final    Small/1+ Yeast                 Review of Systems   Constitutional: Positive for fatigue.   Respiratory: Negative for chest tightness and shortness of breath.    Cardiovascular: Negative for chest pain.   Gastrointestinal: Negative for anal bleeding and blood in stool.   Genitourinary: Negative for difficulty urinating and pelvic pain.   Allergic/Immunologic: Positive for environmental allergies.   Psychiatric/Behavioral: Negative for  dysphoric mood and sleep disturbance. The patient is nervous/anxious.        Physical Exam  Vitals and nursing note reviewed.   Constitutional:       General: She is not in acute distress.     Appearance: Normal appearance. She is well-developed and well-groomed.   HENT:      Head: Normocephalic.      Right Ear: Tympanic membrane and ear canal normal.      Left Ear: Tympanic membrane and ear canal normal.      Nose: Nose normal.      Mouth/Throat:      Pharynx: Oropharynx is clear.   Eyes:      General: Lids are normal.      Conjunctiva/sclera: Conjunctivae normal.   Neck:      Thyroid: No thyromegaly.   Cardiovascular:      Rate and Rhythm: Normal rate and regular rhythm.      Heart sounds: Normal heart sounds.   Pulmonary:      Effort: Pulmonary effort is normal.      Breath sounds: Normal breath sounds.   Abdominal:      General: Bowel sounds are normal.      Palpations: Abdomen is soft.   Genitourinary:     Comments: Declined   Musculoskeletal:      Cervical back: Neck supple.   Skin:     General: Skin is warm and dry.   Neurological:      Mental Status: She is alert and oriented to person, place, and time.   Psychiatric:         Mood and Affect: Mood normal.         ASSESSMENT       Problems Addressed this Visit    None  Visit Diagnoses     Annual physical exam    -  Primary    Screening for colon cancer        Relevant Orders    Cologuard - Stool, Per Rectum      Diagnoses       Codes Comments    Annual physical exam    -  Primary ICD-10-CM: Z00.00  ICD-9-CM: V70.0     Screening for colon cancer     ICD-10-CM: Z12.11  ICD-9-CM: V76.51           PLAN    BP is elevated today. She has not taken her medication. She is monitoring it at home. Advised to avoid taking decongestants as she has been doing   Continue current medications and statins  Cologuard ordered  Age and sex appropriate physical exam performed and documented. Updated past medical, family, social and surgical histories as well as allergies and care  team list. Addressed care gaps listed in the medical record.   -Encouraged seat belt use for every car ride for patient and all occupants. Encouraged sunscreen use to reduce risk of skin cancer for any days with sun exposure over 20 minutes.   -Encouraged annual dental and vision exams as part of their overall health.   -Encouraged minimum of 30 minutes or more of exercise at a brisk walk or higher 5 days per week combined with a well-balanced diet.   -Immunizations reviewed and updated in EMR.     Return in about 6 months (around 11/1/2023) for labs same day .  Patient was given instructions and counseling regarding her  condition for health maintenance advice.  Please see specific information pulled into the AVS if appropriate.

## 2023-05-01 ENCOUNTER — OFFICE VISIT (OUTPATIENT)
Dept: INTERNAL MEDICINE | Facility: CLINIC | Age: 63
End: 2023-05-01
Payer: COMMERCIAL

## 2023-05-01 VITALS
HEART RATE: 89 BPM | SYSTOLIC BLOOD PRESSURE: 148 MMHG | WEIGHT: 230 LBS | BODY MASS INDEX: 36.96 KG/M2 | TEMPERATURE: 97.7 F | DIASTOLIC BLOOD PRESSURE: 80 MMHG | HEIGHT: 66 IN | OXYGEN SATURATION: 98 % | RESPIRATION RATE: 16 BRPM

## 2023-05-01 DIAGNOSIS — Z00.00 ANNUAL PHYSICAL EXAM: Primary | ICD-10-CM

## 2023-05-01 DIAGNOSIS — Z12.11 SCREENING FOR COLON CANCER: ICD-10-CM

## 2023-05-01 PROCEDURE — 99396 PREV VISIT EST AGE 40-64: CPT | Performed by: NURSE PRACTITIONER

## 2023-05-01 RX ORDER — FUROSEMIDE 20 MG/1
20 TABLET ORAL 2 TIMES DAILY
Qty: 180 TABLET | Refills: 3 | Status: SHIPPED | OUTPATIENT
Start: 2023-05-01

## 2023-05-01 RX ORDER — LOSARTAN POTASSIUM 50 MG/1
50 TABLET ORAL DAILY
Qty: 90 TABLET | Refills: 3 | Status: SHIPPED | OUTPATIENT
Start: 2023-05-01

## 2023-05-01 RX ORDER — CITALOPRAM 40 MG/1
40 TABLET ORAL DAILY
Qty: 90 TABLET | Refills: 1 | Status: SHIPPED | OUTPATIENT
Start: 2023-05-01

## 2023-05-01 RX ORDER — AMLODIPINE BESYLATE 10 MG/1
10 TABLET ORAL DAILY
Qty: 90 TABLET | Refills: 3 | Status: SHIPPED | OUTPATIENT
Start: 2023-05-01

## 2023-05-01 RX ORDER — HYDROXYZINE HYDROCHLORIDE 25 MG/1
25 TABLET, FILM COATED ORAL 3 TIMES DAILY PRN
Qty: 90 TABLET | Refills: 3 | Status: SHIPPED | OUTPATIENT
Start: 2023-05-01

## 2023-05-01 RX ORDER — ROSUVASTATIN CALCIUM 40 MG/1
40 TABLET, COATED ORAL NIGHTLY
Qty: 90 TABLET | Refills: 3 | Status: SHIPPED | OUTPATIENT
Start: 2023-05-01

## 2023-06-15 ENCOUNTER — TELEPHONE (OUTPATIENT)
Dept: INTERNAL MEDICINE | Facility: CLINIC | Age: 63
End: 2023-06-15
Payer: COMMERCIAL

## 2023-10-09 RX ORDER — HYDROXYZINE HYDROCHLORIDE 25 MG/1
25 TABLET, FILM COATED ORAL 3 TIMES DAILY PRN
Qty: 90 TABLET | Refills: 3 | Status: SHIPPED | OUTPATIENT
Start: 2023-10-09

## 2023-12-01 PROBLEM — E78.2 MIXED HYPERLIPIDEMIA: Status: ACTIVE | Noted: 2023-12-01

## 2023-12-04 ENCOUNTER — TELEPHONE (OUTPATIENT)
Dept: INTERNAL MEDICINE | Facility: CLINIC | Age: 63
End: 2023-12-04

## 2023-12-07 NOTE — PROGRESS NOTES
Subjective   Shaunna Carreno is a 63 y.o. female. Patient is here today for   Chief Complaint   Patient presents with    Hypertension    Hyperlipidemia    Hyperglycemia          Vitals:    12/08/23 0929   BP: 140/80   Pulse: 78   Temp: 97.7 °F (36.5 °C)   SpO2: 97%     Body mass index is 39.24 kg/m².  The following portions of the patient's history were reviewed and updated as appropriate: allergies, current medications, past family history, past medical history, past social history, past surgical history and problem list.    Past Medical History:   Diagnosis Date    Anxiety     Hyperlipidemia     Hypertension       No Known Allergies   Social History     Socioeconomic History    Marital status:    Tobacco Use    Smoking status: Never    Smokeless tobacco: Never   Vaping Use    Vaping Use: Never used   Substance and Sexual Activity    Alcohol use: Yes     Alcohol/week: 4.0 standard drinks of alcohol     Types: 4 Glasses of wine per week     Comment: 1-2 GLASSES OF WINE A NIGHT    Drug use: Never    Sexual activity: Yes     Partners: Female, Male        Current Outpatient Medications:     amLODIPine (NORVASC) 10 MG tablet, Take 1 tablet by mouth Daily., Disp: 90 tablet, Rfl: 3    cetirizine (zyrTEC) 10 MG tablet, Take 1 tablet by mouth Daily., Disp: 90 tablet, Rfl: 3    citalopram (CeleXA) 40 MG tablet, Take 1 tablet by mouth Daily., Disp: 90 tablet, Rfl: 1    furosemide (LASIX) 20 MG tablet, Take 1 tablet by mouth 2 (Two) Times a Day., Disp: 180 tablet, Rfl: 3    hydrOXYzine (ATARAX) 25 MG tablet, Take 1 tablet by mouth 3 Times a Day As Needed for Anxiety., Disp: 90 tablet, Rfl: 3    losartan (COZAAR) 50 MG tablet, Take 1 tablet by mouth Daily., Disp: 90 tablet, Rfl: 3    Probiotic Product (PROBIOTIC DAILY PO), Take  by mouth., Disp: , Rfl:     rosuvastatin (CRESTOR) 40 MG tablet, Take 1 tablet by mouth Every Night., Disp: 90 tablet, Rfl: 3     Objective     History of Present Illness  Shaunna is a 63 year old  female patient who is here for a 6 month follow up for HTN, HLD, and elevated glucose. She has been monitoring her BP at home and it has been on average less than 130/80. She has been compliant with her medication and is not experiencing any side effects. She is fasting for labs today     Review of Systems   Constitutional:  Negative for fatigue.   HENT: Negative.     Respiratory:  Negative for chest tightness, shortness of breath and wheezing.    Cardiovascular:  Negative for chest pain.   Gastrointestinal: Negative.    Genitourinary: Negative.    Neurological: Negative.    Psychiatric/Behavioral: Negative.         Physical Exam  Vitals and nursing note reviewed.   Constitutional:       General: She is not in acute distress.  HENT:      Head: Normocephalic.   Cardiovascular:      Rate and Rhythm: Normal rate and regular rhythm.      Heart sounds: Normal heart sounds.      Comments: BP recheck 138/70  Pulmonary:      Effort: Pulmonary effort is normal.      Breath sounds: Normal breath sounds.   Neurological:      Mental Status: She is alert.         Assessment    ASSESSMENT    Problems Addressed this Visit       Hyperglycemia    Relevant Orders    Lipid Panel With LDL / HDL Ratio    Hemoglobin A1c    Hypertension - Primary    Relevant Orders    Comprehensive Metabolic Panel    Mixed hyperlipidemia    Relevant Orders    Lipid Panel With LDL / HDL Ratio     Other Visit Diagnoses       Screening mammogram, encounter for        Relevant Orders    Mammo screening digital tomosynthesis bilateral w CAD    Influenza vaccine needed        Relevant Orders    Fluzone >6 Months (3270-5151) (Completed)          Diagnoses         Codes Comments    Primary hypertension    -  Primary ICD-10-CM: I10  ICD-9-CM: 401.9     Mixed hyperlipidemia     ICD-10-CM: E78.2  ICD-9-CM: 272.2     Hyperglycemia     ICD-10-CM: R73.9  ICD-9-CM: 790.29     Screening mammogram, encounter for     ICD-10-CM: Z12.31  ICD-9-CM: V76.12     Influenza  vaccine needed     ICD-10-CM: Z23  ICD-9-CM: V04.81             PLAN    HTN- BP was elevated initially but improved on recheck, continue losartan, amlodipine and furosemide . Continue to check BP at home   HLD- on statin, will check FLP today   Elevated fasting glucose- will check A1c today   HM- declines covid vaccine, mammogram ordered, flu vaccine given   Return in about 6 months (around 6/8/2024) for Annual physical, with labs. Cbc, cmp, lipid , UA, tsh

## 2023-12-08 ENCOUNTER — OFFICE VISIT (OUTPATIENT)
Dept: INTERNAL MEDICINE | Facility: CLINIC | Age: 63
End: 2023-12-08
Payer: COMMERCIAL

## 2023-12-08 VITALS
DIASTOLIC BLOOD PRESSURE: 80 MMHG | OXYGEN SATURATION: 97 % | SYSTOLIC BLOOD PRESSURE: 140 MMHG | HEIGHT: 66 IN | TEMPERATURE: 97.7 F | WEIGHT: 243 LBS | HEART RATE: 78 BPM | BODY MASS INDEX: 39.05 KG/M2

## 2023-12-08 DIAGNOSIS — I10 PRIMARY HYPERTENSION: Primary | ICD-10-CM

## 2023-12-08 DIAGNOSIS — R73.9 HYPERGLYCEMIA: ICD-10-CM

## 2023-12-08 DIAGNOSIS — E78.2 MIXED HYPERLIPIDEMIA: ICD-10-CM

## 2023-12-08 DIAGNOSIS — Z23 INFLUENZA VACCINE NEEDED: ICD-10-CM

## 2023-12-08 DIAGNOSIS — Z12.31 SCREENING MAMMOGRAM, ENCOUNTER FOR: ICD-10-CM

## 2023-12-09 LAB
ALBUMIN SERPL-MCNC: 5.1 G/DL (ref 3.5–5.2)
ALBUMIN/GLOB SERPL: 2.1 G/DL
ALP SERPL-CCNC: 44 U/L (ref 39–117)
ALT SERPL-CCNC: 30 U/L (ref 1–33)
AST SERPL-CCNC: 39 U/L (ref 1–32)
BILIRUB SERPL-MCNC: 0.5 MG/DL (ref 0–1.2)
BUN SERPL-MCNC: 14 MG/DL (ref 8–23)
BUN/CREAT SERPL: 20.9 (ref 7–25)
CALCIUM SERPL-MCNC: 10.2 MG/DL (ref 8.6–10.5)
CHLORIDE SERPL-SCNC: 99 MMOL/L (ref 98–107)
CHOLEST SERPL-MCNC: 234 MG/DL (ref 0–200)
CO2 SERPL-SCNC: 26.2 MMOL/L (ref 22–29)
CREAT SERPL-MCNC: 0.67 MG/DL (ref 0.57–1)
EGFRCR SERPLBLD CKD-EPI 2021: 99.6 ML/MIN/1.73
GLOBULIN SER CALC-MCNC: 2.4 GM/DL
GLUCOSE SERPL-MCNC: 100 MG/DL (ref 65–99)
HBA1C MFR BLD: 5.2 % (ref 4.8–5.6)
HDLC SERPL-MCNC: 75 MG/DL (ref 40–60)
LDLC SERPL CALC-MCNC: 110 MG/DL (ref 0–100)
LDLC/HDLC SERPL: 1.35 {RATIO}
POTASSIUM SERPL-SCNC: 4.2 MMOL/L (ref 3.5–5.2)
PROT SERPL-MCNC: 7.5 G/DL (ref 6–8.5)
SODIUM SERPL-SCNC: 139 MMOL/L (ref 136–145)
TRIGL SERPL-MCNC: 287 MG/DL (ref 0–150)
VLDLC SERPL CALC-MCNC: 49 MG/DL (ref 5–40)

## 2024-01-02 RX ORDER — CITALOPRAM 40 MG/1
40 TABLET ORAL DAILY
Qty: 90 TABLET | Refills: 1 | Status: SHIPPED | OUTPATIENT
Start: 2024-01-02

## 2024-04-24 DIAGNOSIS — Z00.00 ROUTINE HEALTH MAINTENANCE: Primary | ICD-10-CM

## 2024-04-29 RX ORDER — FUROSEMIDE 20 MG/1
20 TABLET ORAL 2 TIMES DAILY
Qty: 180 TABLET | Refills: 3 | Status: SHIPPED | OUTPATIENT
Start: 2024-04-29

## 2024-04-29 RX ORDER — AMLODIPINE BESYLATE 10 MG/1
10 TABLET ORAL DAILY
Qty: 90 TABLET | Refills: 3 | Status: SHIPPED | OUTPATIENT
Start: 2024-04-29

## 2024-04-29 RX ORDER — LOSARTAN POTASSIUM 50 MG/1
50 TABLET ORAL DAILY
Qty: 90 TABLET | Refills: 3 | Status: SHIPPED | OUTPATIENT
Start: 2024-04-29

## 2024-04-29 RX ORDER — ROSUVASTATIN CALCIUM 40 MG/1
40 TABLET, COATED ORAL NIGHTLY
Qty: 90 TABLET | Refills: 3 | Status: SHIPPED | OUTPATIENT
Start: 2024-04-29

## 2024-08-02 RX ORDER — CITALOPRAM 40 MG/1
40 TABLET ORAL DAILY
Qty: 30 TABLET | Refills: 1 | Status: SHIPPED | OUTPATIENT
Start: 2024-08-02

## 2024-08-26 RX ORDER — HYDROXYZINE HYDROCHLORIDE 25 MG/1
25 TABLET, FILM COATED ORAL 3 TIMES DAILY PRN
Qty: 90 TABLET | Refills: 3 | Status: SHIPPED | OUTPATIENT
Start: 2024-08-26

## 2024-10-09 DIAGNOSIS — Z00.00 ROUTINE HEALTH MAINTENANCE: Primary | ICD-10-CM

## 2024-10-21 LAB
ALBUMIN SERPL-MCNC: 4.7 G/DL (ref 3.5–5.2)
ALBUMIN/GLOB SERPL: 1.6 G/DL
ALP SERPL-CCNC: 50 U/L (ref 39–117)
ALT SERPL-CCNC: 35 U/L (ref 1–33)
APPEARANCE UR: CLEAR
AST SERPL-CCNC: 54 U/L (ref 1–32)
BACTERIA #/AREA URNS HPF: NORMAL /HPF
BASOPHILS # BLD AUTO: 0.05 10*3/MM3 (ref 0–0.2)
BASOPHILS NFR BLD AUTO: 1.2 % (ref 0–1.5)
BILIRUB SERPL-MCNC: 0.3 MG/DL (ref 0–1.2)
BILIRUB UR QL STRIP: NEGATIVE
BUN SERPL-MCNC: 11 MG/DL (ref 8–23)
BUN/CREAT SERPL: 12.5 (ref 7–25)
CALCIUM SERPL-MCNC: 10.3 MG/DL (ref 8.6–10.5)
CASTS URNS MICRO: NORMAL
CHLORIDE SERPL-SCNC: 101 MMOL/L (ref 98–107)
CHOLEST SERPL-MCNC: 234 MG/DL (ref 0–200)
CO2 SERPL-SCNC: 24.3 MMOL/L (ref 22–29)
COLOR UR: YELLOW
CREAT SERPL-MCNC: 0.88 MG/DL (ref 0.57–1)
EGFRCR SERPLBLD CKD-EPI 2021: 74.9 ML/MIN/1.73
EOSINOPHIL # BLD AUTO: 0.1 10*3/MM3 (ref 0–0.4)
EOSINOPHIL NFR BLD AUTO: 2.5 % (ref 0.3–6.2)
EPI CELLS #/AREA URNS HPF: NORMAL /HPF
ERYTHROCYTE [DISTWIDTH] IN BLOOD BY AUTOMATED COUNT: 12.1 % (ref 12.3–15.4)
GLOBULIN SER CALC-MCNC: 2.9 GM/DL
GLUCOSE SERPL-MCNC: 97 MG/DL (ref 65–99)
GLUCOSE UR QL STRIP: NEGATIVE
HCT VFR BLD AUTO: 35.6 % (ref 34–46.6)
HDLC SERPL-MCNC: 80 MG/DL (ref 40–60)
HGB BLD-MCNC: 12.4 G/DL (ref 12–15.9)
HGB UR QL STRIP: NEGATIVE
IMM GRANULOCYTES # BLD AUTO: 0.05 10*3/MM3 (ref 0–0.05)
IMM GRANULOCYTES NFR BLD AUTO: 1.2 % (ref 0–0.5)
KETONES UR QL STRIP: NEGATIVE
LDLC SERPL CALC-MCNC: 100 MG/DL (ref 0–100)
LDLC/HDLC SERPL: 1.11 {RATIO}
LEUKOCYTE ESTERASE UR QL STRIP: NEGATIVE
LYMPHOCYTES # BLD AUTO: 1.28 10*3/MM3 (ref 0.7–3.1)
LYMPHOCYTES NFR BLD AUTO: 31.6 % (ref 19.6–45.3)
MCH RBC QN AUTO: 35.8 PG (ref 26.6–33)
MCHC RBC AUTO-ENTMCNC: 34.8 G/DL (ref 31.5–35.7)
MCV RBC AUTO: 102.9 FL (ref 79–97)
MONOCYTES # BLD AUTO: 0.34 10*3/MM3 (ref 0.1–0.9)
MONOCYTES NFR BLD AUTO: 8.4 % (ref 5–12)
NEUTROPHILS # BLD AUTO: 2.23 10*3/MM3 (ref 1.7–7)
NEUTROPHILS NFR BLD AUTO: 55.1 % (ref 42.7–76)
NITRITE UR QL STRIP: NEGATIVE
NRBC BLD AUTO-RTO: 0 /100 WBC (ref 0–0.2)
PH UR STRIP: 7 [PH] (ref 5–8)
PLATELET # BLD AUTO: 177 10*3/MM3 (ref 140–450)
POTASSIUM SERPL-SCNC: 4.1 MMOL/L (ref 3.5–5.2)
PROT SERPL-MCNC: 7.6 G/DL (ref 6–8.5)
PROT UR QL STRIP: ABNORMAL
RBC # BLD AUTO: 3.46 10*6/MM3 (ref 3.77–5.28)
RBC #/AREA URNS HPF: NORMAL /HPF
SODIUM SERPL-SCNC: 139 MMOL/L (ref 136–145)
SP GR UR STRIP: ABNORMAL (ref 1–1.03)
TRIGL SERPL-MCNC: 328 MG/DL (ref 0–150)
TSH SERPL DL<=0.005 MIU/L-ACNC: 2.91 UIU/ML (ref 0.27–4.2)
UROBILINOGEN UR STRIP-MCNC: ABNORMAL MG/DL
VLDLC SERPL CALC-MCNC: 54 MG/DL (ref 5–40)
WBC # BLD AUTO: 4.05 10*3/MM3 (ref 3.4–10.8)
WBC #/AREA URNS HPF: NORMAL /HPF

## 2024-10-24 LAB
HBA1C MFR BLD: 4.6 % (ref 4.8–5.6)
WRITTEN AUTHORIZATION: NORMAL

## 2024-10-28 ENCOUNTER — OFFICE VISIT (OUTPATIENT)
Dept: INTERNAL MEDICINE | Facility: CLINIC | Age: 64
End: 2024-10-28
Payer: COMMERCIAL

## 2024-10-28 VITALS
OXYGEN SATURATION: 97 % | HEART RATE: 75 BPM | SYSTOLIC BLOOD PRESSURE: 128 MMHG | WEIGHT: 241 LBS | HEIGHT: 66 IN | BODY MASS INDEX: 38.73 KG/M2 | DIASTOLIC BLOOD PRESSURE: 60 MMHG | RESPIRATION RATE: 16 BRPM

## 2024-10-28 DIAGNOSIS — R73.9 HYPERGLYCEMIA: ICD-10-CM

## 2024-10-28 DIAGNOSIS — R74.8 ELEVATED LIVER ENZYMES: ICD-10-CM

## 2024-10-28 DIAGNOSIS — Z00.00 ANNUAL PHYSICAL EXAM: Primary | ICD-10-CM

## 2024-10-28 DIAGNOSIS — D75.89 MACROCYTOSIS WITHOUT ANEMIA: ICD-10-CM

## 2024-10-28 DIAGNOSIS — F41.9 ANXIETY: ICD-10-CM

## 2024-10-28 DIAGNOSIS — I10 PRIMARY HYPERTENSION: ICD-10-CM

## 2024-10-28 DIAGNOSIS — E78.2 MIXED HYPERLIPIDEMIA: ICD-10-CM

## 2024-10-28 PROBLEM — E78.00 HIGH CHOLESTEROL: Status: RESOLVED | Noted: 2018-01-31 | Resolved: 2024-10-28

## 2024-10-28 PROCEDURE — 99396 PREV VISIT EST AGE 40-64: CPT | Performed by: NURSE PRACTITIONER

## 2024-10-28 PROCEDURE — 99214 OFFICE O/P EST MOD 30 MIN: CPT | Performed by: NURSE PRACTITIONER

## 2024-10-28 RX ORDER — CITALOPRAM HYDROBROMIDE 40 MG/1
40 TABLET ORAL DAILY
Qty: 90 TABLET | Refills: 3 | Status: SHIPPED | OUTPATIENT
Start: 2024-10-28

## 2024-10-28 NOTE — PROGRESS NOTES
Subjective   Shaunna Carreno is a 64 y.o. female. Patient is here today for   Chief Complaint   Patient presents with    Annual Exam          Vitals:    10/28/24 1425   BP: 128/60   Pulse: 75   Resp: 16   SpO2: 97%     Body mass index is 38.92 kg/m².    The following portions of the patient's history were reviewed and updated as appropriate: allergies, current medications, past family history, past medical history, past social history, past surgical history and problem list.    Past Medical History:   Diagnosis Date    Anxiety     Hyperlipidemia     Hypertension       No Known Allergies   Social History     Socioeconomic History    Marital status:    Tobacco Use    Smoking status: Never    Smokeless tobacco: Never   Vaping Use    Vaping status: Never Used   Substance and Sexual Activity    Alcohol use: Yes     Alcohol/week: 4.0 standard drinks of alcohol     Types: 4 Glasses of wine per week     Comment: 1-2 GLASSES OF WINE A NIGHT    Drug use: Never    Sexual activity: Yes     Partners: Female, Male        Current Outpatient Medications:     amLODIPine (NORVASC) 10 MG tablet, Take 1 tablet by mouth Daily., Disp: 90 tablet, Rfl: 3    cetirizine (zyrTEC) 10 MG tablet, Take 1 tablet by mouth Daily., Disp: 90 tablet, Rfl: 3    citalopram (CeleXA) 40 MG tablet, Take 1 tablet by mouth Daily., Disp: 90 tablet, Rfl: 3    furosemide (LASIX) 20 MG tablet, Take 1 tablet by mouth 2 (Two) Times a Day., Disp: 180 tablet, Rfl: 3    hydrOXYzine (ATARAX) 25 MG tablet, Take 1 tablet by mouth 3 Times a Day As Needed for Anxiety., Disp: 90 tablet, Rfl: 3    losartan (COZAAR) 50 MG tablet, Take 1 tablet by mouth Daily., Disp: 90 tablet, Rfl: 3    Probiotic Product (PROBIOTIC DAILY PO), Take  by mouth., Disp: , Rfl:     rosuvastatin (CRESTOR) 40 MG tablet, Take 1 tablet by mouth Every Night., Disp: 90 tablet, Rfl: 3          Objective   History of Present Illness   Shaunna Carreno 64 y.o. female who presents for an Annual  physical exam and for a follow up for HTN, HLD, anxiety, and  hyperglycemia. She has been doing well with no interval events. She is compliant with her medication and is not experiencing any side effects.    Lab results discussed in detail with the patient.  Plan to update vaccines if needed today.    Health Habits:  Dental Exam. up to date  Eye Exam. up to date    Drinks 1-2 vodka drinks per night       Preventative counseling  - seat belt use for every car ride for patient and all occupants.   Encouraged sunscreen use to reduce risk of skin cancer for any days with sun exposure over 20 minutes.   -Encouraged annual dental and vision exams as part of their overall health.   -Encouraged  exercise  combined with a well-balanced diet.   -Immunizations reviewed and updated in EMR.     The 10-year ASCVD risk score (Tessa BAKER, et al., 2019) is: 4.9%    Values used to calculate the score:      Age: 64 years      Sex: Female      Is Non- : No      Diabetic: No      Tobacco smoker: No      Systolic Blood Pressure: 128 mmHg      Is BP treated: Yes      HDL Cholesterol: 80 mg/dL      Total Cholesterol: 234 mg/dL    Lab Results (most recent)       Orders Only on 10/09/2024   Component Date Value Ref Range Status    WBC 10/21/2024 4.05  3.40 - 10.80 10*3/mm3 Final    RBC 10/21/2024 3.46 (L)  3.77 - 5.28 10*6/mm3 Final    Hemoglobin 10/21/2024 12.4  12.0 - 15.9 g/dL Final    Hematocrit 10/21/2024 35.6  34.0 - 46.6 % Final    MCV 10/21/2024 102.9 (H)  79.0 - 97.0 fL Final    MCH 10/21/2024 35.8 (H)  26.6 - 33.0 pg Final    MCHC 10/21/2024 34.8  31.5 - 35.7 g/dL Final    RDW 10/21/2024 12.1 (L)  12.3 - 15.4 % Final    Platelets 10/21/2024 177  140 - 450 10*3/mm3 Final    Neutrophil Rel % 10/21/2024 55.1  42.7 - 76.0 % Final    Lymphocyte Rel % 10/21/2024 31.6  19.6 - 45.3 % Final    Monocyte Rel % 10/21/2024 8.4  5.0 - 12.0 % Final    Eosinophil Rel % 10/21/2024 2.5  0.3 - 6.2 % Final    Basophil Rel %  10/21/2024 1.2  0.0 - 1.5 % Final    Neutrophils Absolute 10/21/2024 2.23  1.70 - 7.00 10*3/mm3 Final    Lymphocytes Absolute 10/21/2024 1.28  0.70 - 3.10 10*3/mm3 Final    Monocytes Absolute 10/21/2024 0.34  0.10 - 0.90 10*3/mm3 Final    Eosinophils Absolute 10/21/2024 0.10  0.00 - 0.40 10*3/mm3 Final    Basophils Absolute 10/21/2024 0.05  0.00 - 0.20 10*3/mm3 Final    Immature Granulocyte Rel % 10/21/2024 1.2 (H)  0.0 - 0.5 % Final    Immature Grans Absolute 10/21/2024 0.05  0.00 - 0.05 10*3/mm3 Final    nRBC 10/21/2024 0.0  0.0 - 0.2 /100 WBC Final    Glucose 10/21/2024 97  65 - 99 mg/dL Final    BUN 10/21/2024 11  8 - 23 mg/dL Final    Creatinine 10/21/2024 0.88  0.57 - 1.00 mg/dL Final    EGFR Result 10/21/2024 74.9  >60.0 mL/min/1.73 Final    Comment: GFR Normal >60  Chronic Kidney Disease <60  Kidney Failure <15      BUN/Creatinine Ratio 10/21/2024 12.5  7.0 - 25.0 Final    Sodium 10/21/2024 139  136 - 145 mmol/L Final    Potassium 10/21/2024 4.1  3.5 - 5.2 mmol/L Final    Chloride 10/21/2024 101  98 - 107 mmol/L Final    Total CO2 10/21/2024 24.3  22.0 - 29.0 mmol/L Final    Calcium 10/21/2024 10.3  8.6 - 10.5 mg/dL Final    Total Protein 10/21/2024 7.6  6.0 - 8.5 g/dL Final    Albumin 10/21/2024 4.7  3.5 - 5.2 g/dL Final    Globulin 10/21/2024 2.9  gm/dL Final    A/G Ratio 10/21/2024 1.6  g/dL Final    Total Bilirubin 10/21/2024 0.3  0.0 - 1.2 mg/dL Final    Alkaline Phosphatase 10/21/2024 50  39 - 117 U/L Final    AST (SGOT) 10/21/2024 54 (H)  1 - 32 U/L Final    ALT (SGPT) 10/21/2024 35 (H)  1 - 33 U/L Final    Total Cholesterol 10/21/2024 234 (H)  0 - 200 mg/dL Final    Comment: Cholesterol Reference Ranges  (U.S. Department of Health and Human Services ATP III  Classifications)  Desirable          <200 mg/dL  Borderline High    200-239 mg/dL  High Risk          >240 mg/dL  Triglyceride Reference Ranges  (U.S. Department of Health and Human Services ATP III  Classifications)  Normal           <150  mg/dL  Borderline High  150-199 mg/dL  High             200-499 mg/dL  Very High        >500 mg/dL  HDL Reference Ranges  (U.S. Department of Health and Human Services ATP III  Classifications)  Low     <40 mg/dl (major risk factor for CHD)  High    >60 mg/dl ('negative' risk factor for CHD)  LDL Reference Ranges  (U.S. Department of Health and Human Services ATP III  Classifications)  Optimal          <100 mg/dL  Near Optimal     100-129 mg/dL  Borderline High  130-159 mg/dL  High             160-189 mg/dL  Very High        >189 mg/dL      Triglycerides 10/21/2024 328 (H)  0 - 150 mg/dL Final    HDL Cholesterol 10/21/2024 80 (H)  40 - 60 mg/dL Final    VLDL Cholesterol Eduardo 10/21/2024 54 (H)  5 - 40 mg/dL Final    LDL Chol Calc (NIH) 10/21/2024 100  0 - 100 mg/dL Final    LDL/HDL RATIO 10/21/2024 1.11   Final    TSH 10/21/2024 2.910  0.270 - 4.200 uIU/mL Final    Specific Gravity, UA 10/21/2024 Comment  1.005 - 1.030 Final    <=1.005    pH, UA 10/21/2024 7.0  5.0 - 8.0 Final    Color, UA 10/21/2024 Yellow   Final    REFERENCE RANGE: Yellow, Straw    Appearance, UA 10/21/2024 Clear  Clear Final    Leukocytes, UA 10/21/2024 Negative  Negative Final    Protein 10/21/2024 Trace (A)  Negative Final    Glucose, UA 10/21/2024 Negative  Negative Final    Ketones 10/21/2024 Negative  Negative Final    Blood, UA 10/21/2024 Negative  Negative Final    Bilirubin, UA 10/21/2024 Negative  Negative Final    Urobilinogen, UA 10/21/2024 Comment   Final    Comment: 0.2 E.U./dL  REFERENCE RANGE: 0.2 - 1.0 E.U./dL      Nitrite, UA 10/21/2024 Negative  Negative Final    WBC, UA 10/21/2024 0-2  /HPF Final    REFERENCE RANGE: None Seen, 0-2    RBC, UA 10/21/2024 0-2  /HPF Final    REFERENCE RANGE: None Seen, 0-2    Epithelial Cells (non renal) 10/21/2024 0-2  /HPF Final    REFERENCE RANGE: None Seen, 0-2    Cast Type 10/21/2024 Comment   Final    HYALINE CASTS, UA            None Seen        /LPF       None Seen  N    Bacteria, UA  10/21/2024 Comment  None Seen /HPF Final    None Seen    Hemoglobin A1C 10/21/2024 4.60 (L)  4.80 - 5.60 % Final    Comment: Hemoglobin A1C Ranges:  Increased Risk for Diabetes  5.7% to 6.4%  Diabetes                     >= 6.5%  Diabetic Goal                < 7.0%      Written Authorization 10/21/2024 Comment   Final    Comment: Written Authorization Received.  Authorization received from Sabina Carlos for Link Request on 10-  Logged by Elmer Prasad                   Health Maintenance   Topic Date Due    MAMMOGRAM  10/26/2023    COVID-19 Vaccine (1 - 2023-24 season) 11/01/2024 (Originally 9/1/2024)    INFLUENZA VACCINE  11/01/2024 (Originally 8/1/2024)    LIPID PANEL  10/21/2025    ANNUAL PHYSICAL  10/28/2025    BMI FOLLOWUP  10/28/2025    COLORECTAL CANCER SCREENING  06/05/2026    TDAP/TD VACCINES (3 - Td or Tdap) 01/21/2030    HEPATITIS C SCREENING  Completed    ZOSTER VACCINE  Completed    Pneumococcal Vaccine 0-64  Aged Out           Review of Systems   Constitutional:  Negative for fatigue.   HENT:  Positive for postnasal drip and rhinorrhea.    Eyes:  Negative for visual disturbance.   Respiratory: Negative.     Cardiovascular: Negative.    Gastrointestinal:  Positive for nausea.   Genitourinary: Negative.    Musculoskeletal: Negative.    Neurological: Negative.    Psychiatric/Behavioral:  Positive for sleep disturbance (has been taking 2 tylenol PM nightly). The patient is not nervous/anxious (well controlled).        Physical Exam  Vitals and nursing note reviewed.   Constitutional:       General: She is not in acute distress.     Appearance: Normal appearance. She is well-developed and well-groomed.   HENT:      Head: Normocephalic.      Right Ear: Tympanic membrane and ear canal normal.      Left Ear: Tympanic membrane and ear canal normal.      Nose: Nose normal.      Mouth/Throat:      Pharynx: Oropharynx is clear.   Eyes:      General: Lids are normal.      Conjunctiva/sclera:  Conjunctivae normal.   Neck:      Thyroid: No thyromegaly.   Cardiovascular:      Rate and Rhythm: Normal rate and regular rhythm.      Heart sounds: Normal heart sounds.   Pulmonary:      Effort: Pulmonary effort is normal.      Breath sounds: Normal breath sounds.   Abdominal:      General: Bowel sounds are normal.      Palpations: Abdomen is soft.   Musculoskeletal:      Cervical back: Neck supple.   Skin:     General: Skin is warm and dry.   Neurological:      Mental Status: She is alert and oriented to person, place, and time.   Psychiatric:         Mood and Affect: Mood normal.         ASSESSMENT       Problems Addressed this Visit       Hyperglycemia    Hypertension    Anxiety    Mixed hyperlipidemia     Other Visit Diagnoses       Annual physical exam    -  Primary    Elevated liver enzymes        Relevant Orders    US Liver    Ferritin    Vitamin B12    Iron Profile    Hepatitis panel, acute    Macrocytosis without anemia              Diagnoses         Codes Comments    Annual physical exam    -  Primary ICD-10-CM: Z00.00  ICD-9-CM: V70.0     Mixed hyperlipidemia     ICD-10-CM: E78.2  ICD-9-CM: 272.2     Primary hypertension     ICD-10-CM: I10  ICD-9-CM: 401.9     Hyperglycemia     ICD-10-CM: R73.9  ICD-9-CM: 790.29     Anxiety     ICD-10-CM: F41.9  ICD-9-CM: 300.00     Elevated liver enzymes     ICD-10-CM: R74.8  ICD-9-CM: 790.5     Macrocytosis without anemia     ICD-10-CM: D75.89  ICD-9-CM: 289.89             PLAN    Age and sex appropriate physical exam performed and documented. Updated past medical, family, social and surgical histories as well as allergies and care team list.    Fasting glucose is elevated- A1c is normal   HLD - TC is slightly elevated, LDL is at goal, triglycerides are elevated. Continue crestor. Recommend ifestyle modifications for high cholesterol. Decrease/eliminate soda, caffeine, alcohol and overall caloric intake. Reduce carbohydrates and sweets in diet.  Continue to improve  dietary habits with lean proteins, fresh vegetables, fruits, and nuts. Improve aerobic exercise: walking/biking/swimming daily as tolerated,   HTN is well controlled  Anxiety is well controlled on citalopram   Macrocytosis- will check b12 and folate today  Elevated LFTS avoid tylenol and stop taking tylenol PM. Decrease ETOH consumption, will check ferritin, hepatitis panel, and liver US A1c and TSH are normal ;  Anxiety is well controlled on citalopram, she takes hydroxyzine as needed  HM- she will schedule mammogram, Flu vaccine unavailable in office , pt will get at pharmacy        Return in about 6 months (around 4/28/2025) for labs same day, Recheck.  Patient was given instructions and counseling regarding her  condition for health maintenance advice.  Please see specific information pulled into the AVS if appropriate.

## 2024-10-29 LAB
FERRITIN SERPL-MCNC: 860 NG/ML (ref 15–150)
HAV IGM SERPL QL IA: NEGATIVE
HBV CORE IGM SERPL QL IA: NEGATIVE
HBV SURFACE AG SERPL QL IA: NEGATIVE
HCV AB SERPL QL IA: NORMAL
HCV IGG SERPL QL IA: NON REACTIVE
IRON SATN MFR SERPL: 50 % (ref 15–55)
IRON SERPL-MCNC: 172 UG/DL (ref 27–139)
TIBC SERPL-MCNC: 342 UG/DL (ref 250–450)
UIBC SERPL-MCNC: 170 UG/DL (ref 118–369)
VIT B12 SERPL-MCNC: 588 PG/ML (ref 232–1245)

## 2025-03-28 RX ORDER — HYDROXYZINE HYDROCHLORIDE 25 MG/1
25 TABLET, FILM COATED ORAL 3 TIMES DAILY PRN
Qty: 90 TABLET | Refills: 3 | Status: SHIPPED | OUTPATIENT
Start: 2025-03-28

## 2025-05-27 RX ORDER — FUROSEMIDE 20 MG/1
20 TABLET ORAL 2 TIMES DAILY
Qty: 180 TABLET | Refills: 3 | Status: SHIPPED | OUTPATIENT
Start: 2025-05-27 | End: 2025-05-30

## 2025-05-27 RX ORDER — AMLODIPINE BESYLATE 10 MG/1
10 TABLET ORAL DAILY
Qty: 90 TABLET | Refills: 3 | Status: SHIPPED | OUTPATIENT
Start: 2025-05-27

## 2025-05-27 RX ORDER — LOSARTAN POTASSIUM 50 MG/1
50 TABLET ORAL DAILY
Qty: 90 TABLET | Refills: 3 | Status: SHIPPED | OUTPATIENT
Start: 2025-05-27

## 2025-05-27 RX ORDER — ROSUVASTATIN CALCIUM 40 MG/1
40 TABLET, COATED ORAL NIGHTLY
Qty: 90 TABLET | Refills: 3 | Status: SHIPPED | OUTPATIENT
Start: 2025-05-27

## 2025-05-30 ENCOUNTER — OFFICE VISIT (OUTPATIENT)
Dept: INTERNAL MEDICINE | Facility: CLINIC | Age: 65
End: 2025-05-30
Payer: COMMERCIAL

## 2025-05-30 VITALS
BODY MASS INDEX: 39.05 KG/M2 | SYSTOLIC BLOOD PRESSURE: 138 MMHG | OXYGEN SATURATION: 93 % | TEMPERATURE: 98 F | HEART RATE: 89 BPM | WEIGHT: 243 LBS | HEIGHT: 66 IN | DIASTOLIC BLOOD PRESSURE: 64 MMHG

## 2025-05-30 DIAGNOSIS — E78.2 MIXED HYPERLIPIDEMIA: ICD-10-CM

## 2025-05-30 DIAGNOSIS — R79.89 ELEVATED LFTS: ICD-10-CM

## 2025-05-30 DIAGNOSIS — Z12.31 SCREENING MAMMOGRAM, ENCOUNTER FOR: ICD-10-CM

## 2025-05-30 DIAGNOSIS — I10 PRIMARY HYPERTENSION: Primary | ICD-10-CM

## 2025-05-30 DIAGNOSIS — R79.89 ELEVATED FERRITIN: ICD-10-CM

## 2025-05-30 RX ORDER — FUROSEMIDE 40 MG/1
40 TABLET ORAL 2 TIMES DAILY
Qty: 180 TABLET | Refills: 1 | Status: SHIPPED | OUTPATIENT
Start: 2025-05-30

## 2025-05-30 NOTE — PROGRESS NOTES
Subjective   Shaunna Carreno is a 64 y.o. female. Patient is here today for   Chief Complaint   Patient presents with    Hypertension   Answers submitted by the patient for this visit:  Problem not listed (Submitted on 5/29/2025)  Chief Complaint: Other medical problem  anorexia: No  joint pain: No  change in stool: No  joint swelling: No  vertigo: No  visual change: No  Onset: at an unknown time           Vitals:    05/30/25 0922   BP: 138/64   Pulse: 89   Temp: 98 °F (36.7 °C)   SpO2: 93%     Body mass index is 39.25 kg/m².  The following portions of the patient's history were reviewed and updated as appropriate: allergies, current medications, past family history, past medical history, past social history, past surgical history and problem list.    History of Present Illness  Shaunna is here for a follow up for HTN and HLD. She was last seen in October 2024. She is due for labs today. .    She has been monitoring her blood pressure at home, which has remained within normal limits. She reports no chest pain or shortness of breath. She is currently on a regimen of Lasix 20 mg twice daily, administered in the morning and post-lunch.    Despite taking Lasix 20 mg twice daily, she continues to experience significant swelling in her lower extremities upon returning home from work. This swelling subsides when she elevates her legs while seated in a recliner. She is considering an increase in her Lasix dosage due to persistent swelling.    She has discontinued her daily intake of Tylenol PM, which she previously took twice daily. Liver US was ordered for elevated liver enzymes but she did not schedule it.  She is not currently taking any iron supplements. Her current supplement regimen includes beef liver, magnesium, B12, and fish oil, with the addition of beef liver starting 3 weeks ago. She has transitioned from Tylenol PM to Benadryl for sleep management.    She has attempted to schedule a mammogram but was informed  that it needs to be arranged through our office. She conducts monthly self-breast examinations and has not detected any abnormalities.    Past Medical History:   Diagnosis Date    Anxiety     Hyperlipidemia     Hypertension       No Known Allergies   Social History     Socioeconomic History    Marital status:    Tobacco Use    Smoking status: Never    Smokeless tobacco: Never   Vaping Use    Vaping status: Never Used   Substance and Sexual Activity    Alcohol use: Yes     Alcohol/week: 4.0 standard drinks of alcohol     Types: 4 Glasses of wine per week     Comment: 1-2 GLASSES OF WINE A NIGHT    Drug use: Never    Sexual activity: Yes     Partners: Female, Male        Current Outpatient Medications:     amLODIPine (NORVASC) 10 MG tablet, Take 1 tablet by mouth Daily., Disp: 90 tablet, Rfl: 3    cetirizine (zyrTEC) 10 MG tablet, Take 1 tablet by mouth Daily., Disp: 90 tablet, Rfl: 3    citalopram (CeleXA) 40 MG tablet, Take 1 tablet by mouth Daily., Disp: 90 tablet, Rfl: 3    furosemide (LASIX) 40 MG tablet, Take 1 tablet by mouth 2 (Two) Times a Day., Disp: 180 tablet, Rfl: 1    hydrOXYzine (ATARAX) 25 MG tablet, Take 1 tablet by mouth 3 Times a Day As Needed for Anxiety., Disp: 90 tablet, Rfl: 3    losartan (COZAAR) 50 MG tablet, Take 1 tablet by mouth Daily., Disp: 90 tablet, Rfl: 3    Probiotic Product (PROBIOTIC DAILY PO), Take  by mouth., Disp: , Rfl:     rosuvastatin (CRESTOR) 40 MG tablet, Take 1 tablet by mouth Every Night., Disp: 90 tablet, Rfl: 3   Review of Systems   Constitutional:  Negative for chills, diaphoresis, fatigue and fever.   HENT:  Negative for congestion and sore throat.    Respiratory:  Negative for cough.    Cardiovascular:  Negative for chest pain.   Gastrointestinal:  Negative for abdominal pain and nausea.   Musculoskeletal:  Negative for myalgias and neck pain.   Skin:  Negative for rash.   Neurological:  Negative for weakness, numbness and headaches.       Objective   Physical  Exam  Vitals and nursing note reviewed.   Constitutional:       General: She is not in acute distress.  HENT:      Head: Normocephalic.   Cardiovascular:      Rate and Rhythm: Normal rate and regular rhythm.      Heart sounds: Normal heart sounds.      Comments: Trace LE edema bilaterally   Pulmonary:      Effort: Pulmonary effort is normal.      Breath sounds: Normal breath sounds.   Musculoskeletal:      Right lower leg: Edema present.      Left lower leg: Edema present.   Skin:     General: Skin is warm and dry.   Neurological:      Mental Status: She is alert and oriented to person, place, and time.   Psychiatric:         Mood and Affect: Mood normal.         Assessment    Diagnoses and all orders for this visit:    1. Primary hypertension (Primary)  -     CBC & Differential  -     Comprehensive Metabolic Panel    2. Mixed hyperlipidemia  -     Lipid Panel With LDL / HDL Ratio    3. Elevated LFTs  -     Iron Profile w/o Ferritin    4. Elevated ferritin  -     Ferritin    5. Screening mammogram, encounter for  -     Mammo screening digital tomosynthesis bilateral w CAD; Future    Other orders  -     furosemide (LASIX) 40 MG tablet; Take 1 tablet by mouth 2 (Two) Times a Day.  Dispense: 180 tablet; Refill: 1        Assessment & Plan  HTN is well controlled     2. Lower extremity edema.  - Reports swelling in legs, which subsides upon reclining.  - Discussed increasing Lasix dosage to 40 mg twice daily; current prescription for 20 mg twice daily will be adjusted.  - Advised to maintain hydration, elevate legs, and monitor potassium levels; potassium supplement may be added     2. Elevated liver enzymes.  - Previous labs showed elevated iron levels and liver enzymes.  - Liver ultrasound was ordered but not completed.  - Labs will be rechecked today; if liver enzymes remain elevated, an ultrasound will be performed to rule out fatty liver, hepatic steatosis, or gallbladder issues.  3. HLD- continue current medication  and recommend low cholesterol diet and exercise   3. Health maintenance.  - Mammogram order placed; patient advised to schedule at her earliest convenience.  - Pneumonia vaccine recommended for individuals aged 50 and above; can be administered during next visit or at a pharmacy.  -Follow-up  - Patient will follow up in 10/2025 for fasting labs.        Return in about 5 months (around 10/30/2025) for as scheduled.    Patient or patient representative verbalized consent for the use of Ambient Listening during the visit with  JOSEPH Allen for chart documentation. 5/30/2025  13:04 EDT

## 2025-06-02 LAB
ALBUMIN SERPL-MCNC: 4.7 G/DL (ref 3.5–5.2)
ALBUMIN/GLOB SERPL: 1.6 G/DL
ALP SERPL-CCNC: 41 U/L (ref 39–117)
ALT SERPL-CCNC: 35 U/L (ref 1–33)
AST SERPL-CCNC: 53 U/L (ref 1–32)
BASOPHILS # BLD MANUAL: 0.13 10*3/MM3 (ref 0–0.2)
BASOPHILS NFR BLD MANUAL: 2 % (ref 0–1.5)
BILIRUB SERPL-MCNC: 0.5 MG/DL (ref 0–1.2)
BUN SERPL-MCNC: 15 MG/DL (ref 8–23)
BUN/CREAT SERPL: 14.7 (ref 7–25)
CALCIUM SERPL-MCNC: 10.3 MG/DL (ref 8.6–10.5)
CHLORIDE SERPL-SCNC: 97 MMOL/L (ref 98–107)
CHOLEST SERPL-MCNC: 222 MG/DL (ref 0–200)
CO2 SERPL-SCNC: 24.9 MMOL/L (ref 22–29)
CREAT SERPL-MCNC: 1.02 MG/DL (ref 0.57–1)
DIFFERENTIAL COMMENT: ABNORMAL
EGFRCR SERPLBLD CKD-EPI 2021: 62.3 ML/MIN/1.73
ERYTHROCYTE [DISTWIDTH] IN BLOOD BY AUTOMATED COUNT: 13 % (ref 12.3–15.4)
FERRITIN SERPL-MCNC: 625 NG/ML (ref 13–150)
GLOBULIN SER CALC-MCNC: 2.9 GM/DL
GLUCOSE SERPL-MCNC: 98 MG/DL (ref 65–99)
HCT VFR BLD AUTO: 34.9 % (ref 34–46.6)
HDLC SERPL-MCNC: 87 MG/DL (ref 40–60)
HGB BLD-MCNC: 12.3 G/DL (ref 12–15.9)
IRON SATN MFR SERPL: 22 % (ref 20–50)
IRON SERPL-MCNC: 87 MCG/DL (ref 37–145)
LDLC SERPL CALC-MCNC: 86 MG/DL (ref 0–100)
LDLC/HDLC SERPL: 0.85 {RATIO}
LYMPHOCYTES # BLD MANUAL: 1.34 10*3/MM3 (ref 0.7–3.1)
LYMPHOCYTES NFR BLD MANUAL: 20.2 % (ref 19.6–45.3)
MCH RBC QN AUTO: 37 PG (ref 26.6–33)
MCHC RBC AUTO-ENTMCNC: 35.2 G/DL (ref 31.5–35.7)
MCV RBC AUTO: 105.1 FL (ref 79–97)
MONOCYTES # BLD MANUAL: 0.74 10*3/MM3 (ref 0.1–0.9)
MONOCYTES NFR BLD MANUAL: 11.1 % (ref 5–12)
NEUTROPHILS # BLD MANUAL: 4.42 10*3/MM3 (ref 1.7–7)
NEUTROPHILS NFR BLD MANUAL: 66.7 % (ref 42.7–76)
NRBC BLD AUTO-RTO: 0 /100 WBC (ref 0–0.2)
PLATELET # BLD AUTO: 151 10*3/MM3 (ref 140–450)
PLATELET BLD QL SMEAR: ABNORMAL
POTASSIUM SERPL-SCNC: 3.8 MMOL/L (ref 3.5–5.2)
PROT SERPL-MCNC: 7.6 G/DL (ref 6–8.5)
RBC # BLD AUTO: 3.32 10*6/MM3 (ref 3.77–5.28)
RBC MORPH BLD: ABNORMAL
SODIUM SERPL-SCNC: 138 MMOL/L (ref 136–145)
TIBC SERPL-MCNC: 390 MCG/DL
TRIGL SERPL-MCNC: 307 MG/DL (ref 0–150)
UIBC SERPL-MCNC: 303 MCG/DL (ref 112–346)
VLDLC SERPL CALC-MCNC: 49 MG/DL (ref 5–40)
WBC # BLD AUTO: 6.63 10*3/MM3 (ref 3.4–10.8)

## 2025-06-05 ENCOUNTER — TELEPHONE (OUTPATIENT)
Dept: INTERNAL MEDICINE | Facility: CLINIC | Age: 65
End: 2025-06-05
Payer: COMMERCIAL

## 2025-06-05 ENCOUNTER — RESULTS FOLLOW-UP (OUTPATIENT)
Dept: INTERNAL MEDICINE | Facility: CLINIC | Age: 65
End: 2025-06-05
Payer: COMMERCIAL

## 2025-06-05 RX ORDER — EZETIMIBE 10 MG/1
10 TABLET ORAL DAILY
Qty: 30 TABLET | Refills: 5 | Status: SHIPPED | OUTPATIENT
Start: 2025-06-05

## 2025-06-05 NOTE — TELEPHONE ENCOUNTER
Spoke with pt regarding lab results  Lfts are still slightly elevated, she will go ahead and call and schedule the liver US ordered in oct  Ferritin is also elevated  No tylenol   Drinks 1-2 etoh drinks on weekends only  Lab w/u done in oct- will discuss further w/u with Dr Moran   Cholesterol is also elevated, continue crestor, will add zetia 10mg daily  Also discussed regular exercise and dietary changes  Pt verbalized understanding

## 2025-06-06 LAB
FOLATE SERPL-MCNC: <2 NG/ML (ref 4.78–24.2)
WRITTEN AUTHORIZATION: NORMAL

## 2025-06-09 RX ORDER — FOLIC ACID 1 MG/1
1 TABLET ORAL DAILY
Qty: 30 TABLET | Refills: 3 | Status: SHIPPED | OUTPATIENT
Start: 2025-06-09

## 2025-06-16 ENCOUNTER — HOSPITAL ENCOUNTER (OUTPATIENT)
Dept: ULTRASOUND IMAGING | Facility: HOSPITAL | Age: 65
Discharge: HOME | End: 2025-06-16
Admitting: NURSE PRACTITIONER
Payer: COMMERCIAL

## 2025-06-16 DIAGNOSIS — R74.8 ELEVATED LIVER ENZYMES: ICD-10-CM

## 2025-06-16 PROCEDURE — 76705 ECHO EXAM OF ABDOMEN: CPT

## 2025-07-21 ENCOUNTER — APPOINTMENT (OUTPATIENT)
Dept: WOMENS IMAGING | Facility: HOSPITAL | Age: 65
End: 2025-07-21
Payer: COMMERCIAL

## 2025-07-21 PROCEDURE — 77063 BREAST TOMOSYNTHESIS BI: CPT | Performed by: RADIOLOGY

## 2025-07-21 PROCEDURE — 77067 SCR MAMMO BI INCL CAD: CPT | Performed by: RADIOLOGY

## 2025-08-05 ENCOUNTER — APPOINTMENT (OUTPATIENT)
Dept: WOMENS IMAGING | Facility: HOSPITAL | Age: 65
End: 2025-08-05
Payer: COMMERCIAL

## 2025-08-05 PROCEDURE — G0279 TOMOSYNTHESIS, MAMMO: HCPCS | Performed by: RADIOLOGY

## 2025-08-05 PROCEDURE — 77065 DX MAMMO INCL CAD UNI: CPT | Performed by: RADIOLOGY

## 2025-08-05 PROCEDURE — 77061 BREAST TOMOSYNTHESIS UNI: CPT | Performed by: RADIOLOGY

## 2025-08-19 ENCOUNTER — OFFICE VISIT (OUTPATIENT)
Dept: SURGERY | Facility: CLINIC | Age: 65
End: 2025-08-19
Payer: COMMERCIAL

## 2025-08-19 VITALS
HEIGHT: 66 IN | BODY MASS INDEX: 39.34 KG/M2 | HEART RATE: 81 BPM | SYSTOLIC BLOOD PRESSURE: 162 MMHG | DIASTOLIC BLOOD PRESSURE: 88 MMHG | WEIGHT: 244.8 LBS | OXYGEN SATURATION: 96 %

## 2025-08-19 DIAGNOSIS — Z80.3 FH: BREAST CANCER: ICD-10-CM

## 2025-08-19 DIAGNOSIS — R92.8 ABNORMAL MAMMOGRAM OF LEFT BREAST: Primary | ICD-10-CM

## 2025-08-21 ENCOUNTER — TELEPHONE (OUTPATIENT)
Dept: SURGERY | Facility: CLINIC | Age: 65
End: 2025-08-21
Payer: COMMERCIAL

## 2025-08-22 ENCOUNTER — PATIENT ROUNDING (BHMG ONLY) (OUTPATIENT)
Dept: SURGERY | Facility: CLINIC | Age: 65
End: 2025-08-22
Payer: COMMERCIAL

## 2025-08-29 ENCOUNTER — PRE-ADMISSION TESTING (OUTPATIENT)
Dept: PREADMISSION TESTING | Facility: HOSPITAL | Age: 65
End: 2025-08-29
Payer: COMMERCIAL

## 2025-08-29 VITALS
DIASTOLIC BLOOD PRESSURE: 78 MMHG | HEIGHT: 66 IN | OXYGEN SATURATION: 97 % | SYSTOLIC BLOOD PRESSURE: 156 MMHG | WEIGHT: 247 LBS | BODY MASS INDEX: 39.7 KG/M2 | HEART RATE: 90 BPM | RESPIRATION RATE: 20 BRPM | TEMPERATURE: 98.8 F

## 2025-08-29 DIAGNOSIS — R92.8 ABNORMAL MAMMOGRAM OF LEFT BREAST: ICD-10-CM

## 2025-08-29 LAB
ANION GAP SERPL CALCULATED.3IONS-SCNC: 16 MMOL/L (ref 5–15)
BASOPHILS # BLD AUTO: 0.04 10*3/MM3 (ref 0–0.2)
BASOPHILS NFR BLD AUTO: 0.8 % (ref 0–1.5)
BUN SERPL-MCNC: 18 MG/DL (ref 8–23)
BUN/CREAT SERPL: 18.9 (ref 7–25)
CALCIUM SPEC-SCNC: 10.2 MG/DL (ref 8.6–10.5)
CHLORIDE SERPL-SCNC: 97 MMOL/L (ref 98–107)
CO2 SERPL-SCNC: 24 MMOL/L (ref 22–29)
CREAT SERPL-MCNC: 0.95 MG/DL (ref 0.57–1)
DEPRECATED RDW RBC AUTO: 44 FL (ref 37–54)
EGFRCR SERPLBLD CKD-EPI 2021: 67.9 ML/MIN/1.73
EOSINOPHIL # BLD AUTO: 0.08 10*3/MM3 (ref 0–0.4)
EOSINOPHIL NFR BLD AUTO: 1.5 % (ref 0.3–6.2)
ERYTHROCYTE [DISTWIDTH] IN BLOOD BY AUTOMATED COUNT: 12.4 % (ref 12.3–15.4)
GLUCOSE SERPL-MCNC: 111 MG/DL (ref 65–99)
HCT VFR BLD AUTO: 31.3 % (ref 34–46.6)
HGB BLD-MCNC: 11.1 G/DL (ref 12–15.9)
IMM GRANULOCYTES # BLD AUTO: 0.05 10*3/MM3 (ref 0–0.05)
IMM GRANULOCYTES NFR BLD AUTO: 1 % (ref 0–0.5)
LYMPHOCYTES # BLD AUTO: 1.26 10*3/MM3 (ref 0.7–3.1)
LYMPHOCYTES NFR BLD AUTO: 24.3 % (ref 19.6–45.3)
MCH RBC QN AUTO: 35.4 PG (ref 26.6–33)
MCHC RBC AUTO-ENTMCNC: 35.5 G/DL (ref 31.5–35.7)
MCV RBC AUTO: 99.7 FL (ref 79–97)
MONOCYTES # BLD AUTO: 0.41 10*3/MM3 (ref 0.1–0.9)
MONOCYTES NFR BLD AUTO: 7.9 % (ref 5–12)
NEUTROPHILS NFR BLD AUTO: 3.35 10*3/MM3 (ref 1.7–7)
NEUTROPHILS NFR BLD AUTO: 64.5 % (ref 42.7–76)
PLATELET # BLD AUTO: 137 10*3/MM3 (ref 140–450)
PMV BLD AUTO: 11.4 FL (ref 6–12)
POTASSIUM SERPL-SCNC: 3.5 MMOL/L (ref 3.5–5.2)
QT INTERVAL: 445 MS
QTC INTERVAL: 565 MS
RBC # BLD AUTO: 3.14 10*6/MM3 (ref 3.77–5.28)
SODIUM SERPL-SCNC: 137 MMOL/L (ref 136–145)
WBC NRBC COR # BLD AUTO: 5.19 10*3/MM3 (ref 3.4–10.8)

## 2025-08-29 PROCEDURE — 80048 BASIC METABOLIC PNL TOTAL CA: CPT

## 2025-08-29 PROCEDURE — 36415 COLL VENOUS BLD VENIPUNCTURE: CPT

## 2025-08-29 PROCEDURE — 85025 COMPLETE CBC W/AUTO DIFF WBC: CPT

## 2025-08-29 PROCEDURE — 93005 ELECTROCARDIOGRAM TRACING: CPT

## 2025-08-29 RX ORDER — FEXOFENADINE HCL 180 MG/1
180 TABLET ORAL AS NEEDED
COMMUNITY